# Patient Record
Sex: MALE | Race: WHITE | NOT HISPANIC OR LATINO | Employment: FULL TIME | URBAN - METROPOLITAN AREA
[De-identification: names, ages, dates, MRNs, and addresses within clinical notes are randomized per-mention and may not be internally consistent; named-entity substitution may affect disease eponyms.]

---

## 2018-08-04 ENCOUNTER — HOSPITAL ENCOUNTER (INPATIENT)
Facility: HOSPITAL | Age: 78
LOS: 2 days | Discharge: HOME | End: 2018-08-06
Attending: INTERNAL MEDICINE | Admitting: INTERNAL MEDICINE

## 2018-08-04 ENCOUNTER — APPOINTMENT (OUTPATIENT)
Dept: GENERAL RADIOLOGY | Facility: HOSPITAL | Age: 78
End: 2018-08-04

## 2018-08-04 DIAGNOSIS — I48.91 ATRIAL FIBRILLATION, TRANSIENT (HCC): ICD-10-CM

## 2018-08-04 DIAGNOSIS — I21.4 NSTEMI (NON-ST ELEVATED MYOCARDIAL INFARCTION) (HCC): Primary | ICD-10-CM

## 2018-08-04 PROBLEM — R07.9 CHEST PAIN: Status: ACTIVE | Noted: 2018-08-04

## 2018-08-04 PROBLEM — I10 ESSENTIAL HYPERTENSION: Status: ACTIVE | Noted: 2018-08-04

## 2018-08-04 PROBLEM — Z86.73 HISTORY OF CVA (CEREBROVASCULAR ACCIDENT): Status: ACTIVE | Noted: 2018-08-04

## 2018-08-04 LAB
ACT BLD: 213 SECONDS (ref 82–152)
ACT BLD: 257 SECONDS (ref 82–152)
ALBUMIN SERPL-MCNC: 3.64 G/DL (ref 3.2–4.8)
ALBUMIN/GLOB SERPL: 1.3 G/DL (ref 1.5–2.5)
ALP SERPL-CCNC: 50 U/L (ref 25–100)
ALT SERPL W P-5'-P-CCNC: 16 U/L (ref 7–40)
ANION GAP SERPL CALCULATED.3IONS-SCNC: 5 MMOL/L (ref 3–11)
APTT PPP: 47 SECONDS (ref 55–70)
APTT PPP: 69.6 SECONDS (ref 24–31)
ARTICHOKE IGE QN: 139 MG/DL (ref 0–130)
AST SERPL-CCNC: 22 U/L (ref 0–33)
BASOPHILS # BLD AUTO: 0.03 10*3/MM3 (ref 0–0.2)
BASOPHILS NFR BLD AUTO: 0.5 % (ref 0–1)
BILIRUB SERPL-MCNC: 0.6 MG/DL (ref 0.3–1.2)
BNP SERPL-MCNC: 50 PG/ML (ref 0–100)
BUN BLD-MCNC: 18 MG/DL (ref 9–23)
BUN/CREAT SERPL: 16.5 (ref 7–25)
CALCIUM SPEC-SCNC: 9.4 MG/DL (ref 8.7–10.4)
CHLORIDE SERPL-SCNC: 107 MMOL/L (ref 99–109)
CHOLEST SERPL-MCNC: 169 MG/DL (ref 0–200)
CO2 SERPL-SCNC: 30 MMOL/L (ref 20–31)
CREAT BLD-MCNC: 1.09 MG/DL (ref 0.6–1.3)
DEPRECATED RDW RBC AUTO: 43.5 FL (ref 37–54)
EOSINOPHIL # BLD AUTO: 0.28 10*3/MM3 (ref 0–0.3)
EOSINOPHIL NFR BLD AUTO: 4.8 % (ref 0–3)
ERYTHROCYTE [DISTWIDTH] IN BLOOD BY AUTOMATED COUNT: 13.5 % (ref 11.3–14.5)
GFR SERPL CREATININE-BSD FRML MDRD: 65 ML/MIN/1.73
GLOBULIN UR ELPH-MCNC: 2.9 GM/DL
GLUCOSE BLD-MCNC: 95 MG/DL (ref 70–100)
HBA1C MFR BLD: 5.6 % (ref 4.8–5.6)
HCT VFR BLD AUTO: 41.8 % (ref 38.9–50.9)
HDLC SERPL-MCNC: 37 MG/DL (ref 40–60)
HGB BLD-MCNC: 13.6 G/DL (ref 13.1–17.5)
IMM GRANULOCYTES # BLD: 0 10*3/MM3 (ref 0–0.03)
IMM GRANULOCYTES NFR BLD: 0 % (ref 0–0.6)
INR PPP: 1.1 (ref 0.91–1.09)
LYMPHOCYTES # BLD AUTO: 2.08 10*3/MM3 (ref 0.6–4.8)
LYMPHOCYTES NFR BLD AUTO: 35.6 % (ref 24–44)
MAGNESIUM SERPL-MCNC: 2 MG/DL (ref 1.3–2.7)
MCH RBC QN AUTO: 28.6 PG (ref 27–31)
MCHC RBC AUTO-ENTMCNC: 32.5 G/DL (ref 32–36)
MCV RBC AUTO: 87.8 FL (ref 80–99)
MONOCYTES # BLD AUTO: 0.49 10*3/MM3 (ref 0–1)
MONOCYTES NFR BLD AUTO: 8.4 % (ref 0–12)
NEUTROPHILS # BLD AUTO: 2.97 10*3/MM3 (ref 1.5–8.3)
NEUTROPHILS NFR BLD AUTO: 50.7 % (ref 41–71)
PLATELET # BLD AUTO: 179 10*3/MM3 (ref 150–450)
PMV BLD AUTO: 11.1 FL (ref 6–12)
POTASSIUM BLD-SCNC: 3.9 MMOL/L (ref 3.5–5.5)
PROT SERPL-MCNC: 6.5 G/DL (ref 5.7–8.2)
PROTHROMBIN TIME: 11.6 SECONDS (ref 9.6–11.5)
RBC # BLD AUTO: 4.76 10*6/MM3 (ref 4.2–5.76)
SODIUM BLD-SCNC: 142 MMOL/L (ref 132–146)
TRIGL SERPL-MCNC: 42 MG/DL (ref 0–150)
TROPONIN I SERPL-MCNC: 0.41 NG/ML
TROPONIN I SERPL-MCNC: 0.78 NG/ML
TSH SERPL DL<=0.05 MIU/L-ACNC: 7.6 MIU/ML (ref 0.35–5.35)
WBC NRBC COR # BLD: 5.85 10*3/MM3 (ref 3.5–10.8)

## 2018-08-04 PROCEDURE — 83735 ASSAY OF MAGNESIUM: CPT | Performed by: NURSE PRACTITIONER

## 2018-08-04 PROCEDURE — 25010000002 HEPARIN (PORCINE) PER 1000 UNITS: Performed by: INTERNAL MEDICINE

## 2018-08-04 PROCEDURE — C1769 GUIDE WIRE: HCPCS | Performed by: INTERNAL MEDICINE

## 2018-08-04 PROCEDURE — 71045 X-RAY EXAM CHEST 1 VIEW: CPT

## 2018-08-04 PROCEDURE — C1725 CATH, TRANSLUMIN NON-LASER: HCPCS | Performed by: INTERNAL MEDICINE

## 2018-08-04 PROCEDURE — 80061 LIPID PANEL: CPT | Performed by: NURSE PRACTITIONER

## 2018-08-04 PROCEDURE — 84484 ASSAY OF TROPONIN QUANT: CPT | Performed by: NURSE PRACTITIONER

## 2018-08-04 PROCEDURE — 80053 COMPREHEN METABOLIC PANEL: CPT | Performed by: NURSE PRACTITIONER

## 2018-08-04 PROCEDURE — C1887 CATHETER, GUIDING: HCPCS | Performed by: INTERNAL MEDICINE

## 2018-08-04 PROCEDURE — 93458 L HRT ARTERY/VENTRICLE ANGIO: CPT | Performed by: INTERNAL MEDICINE

## 2018-08-04 PROCEDURE — 0 IOPAMIDOL PER 1 ML: Performed by: INTERNAL MEDICINE

## 2018-08-04 PROCEDURE — 25010000002 HEPARIN (PORCINE) PER 1000 UNITS: Performed by: NURSE PRACTITIONER

## 2018-08-04 PROCEDURE — 85730 THROMBOPLASTIN TIME PARTIAL: CPT | Performed by: NURSE PRACTITIONER

## 2018-08-04 PROCEDURE — 85610 PROTHROMBIN TIME: CPT | Performed by: NURSE PRACTITIONER

## 2018-08-04 PROCEDURE — 25010000002 FENTANYL CITRATE (PF) 100 MCG/2ML SOLUTION: Performed by: INTERNAL MEDICINE

## 2018-08-04 PROCEDURE — 83036 HEMOGLOBIN GLYCOSYLATED A1C: CPT | Performed by: NURSE PRACTITIONER

## 2018-08-04 PROCEDURE — 92921 PR PRQ TRLUML CORONARY ANGIOPLASTY ADDL BRANCH: CPT | Performed by: INTERNAL MEDICINE

## 2018-08-04 PROCEDURE — 4A023N7 MEASUREMENT OF CARDIAC SAMPLING AND PRESSURE, LEFT HEART, PERCUTANEOUS APPROACH: ICD-10-PCS | Performed by: INTERNAL MEDICINE

## 2018-08-04 PROCEDURE — 25010000002 MIDAZOLAM PER 1 MG: Performed by: INTERNAL MEDICINE

## 2018-08-04 PROCEDURE — 25010000002 ONDANSETRON PER 1 MG: Performed by: INTERNAL MEDICINE

## 2018-08-04 PROCEDURE — 027034Z DILATION OF CORONARY ARTERY, ONE ARTERY WITH DRUG-ELUTING INTRALUMINAL DEVICE, PERCUTANEOUS APPROACH: ICD-10-PCS | Performed by: INTERNAL MEDICINE

## 2018-08-04 PROCEDURE — 02703ZZ DILATION OF CORONARY ARTERY, ONE ARTERY, PERCUTANEOUS APPROACH: ICD-10-PCS | Performed by: INTERNAL MEDICINE

## 2018-08-04 PROCEDURE — C1894 INTRO/SHEATH, NON-LASER: HCPCS | Performed by: INTERNAL MEDICINE

## 2018-08-04 PROCEDURE — 92928 PRQ TCAT PLMT NTRAC ST 1 LES: CPT | Performed by: INTERNAL MEDICINE

## 2018-08-04 PROCEDURE — B2151ZZ FLUOROSCOPY OF LEFT HEART USING LOW OSMOLAR CONTRAST: ICD-10-PCS | Performed by: INTERNAL MEDICINE

## 2018-08-04 PROCEDURE — 85025 COMPLETE CBC W/AUTO DIFF WBC: CPT | Performed by: NURSE PRACTITIONER

## 2018-08-04 PROCEDURE — 83880 ASSAY OF NATRIURETIC PEPTIDE: CPT | Performed by: NURSE PRACTITIONER

## 2018-08-04 PROCEDURE — 92921: CPT | Performed by: INTERNAL MEDICINE

## 2018-08-04 PROCEDURE — C9600 PERC DRUG-EL COR STENT SING: HCPCS | Performed by: INTERNAL MEDICINE

## 2018-08-04 PROCEDURE — 85347 COAGULATION TIME ACTIVATED: CPT

## 2018-08-04 PROCEDURE — 93005 ELECTROCARDIOGRAM TRACING: CPT | Performed by: NURSE PRACTITIONER

## 2018-08-04 PROCEDURE — B2111ZZ FLUOROSCOPY OF MULTIPLE CORONARY ARTERIES USING LOW OSMOLAR CONTRAST: ICD-10-PCS | Performed by: INTERNAL MEDICINE

## 2018-08-04 PROCEDURE — 99222 1ST HOSP IP/OBS MODERATE 55: CPT | Performed by: INTERNAL MEDICINE

## 2018-08-04 PROCEDURE — 84443 ASSAY THYROID STIM HORMONE: CPT | Performed by: NURSE PRACTITIONER

## 2018-08-04 PROCEDURE — 93010 ELECTROCARDIOGRAM REPORT: CPT | Performed by: INTERNAL MEDICINE

## 2018-08-04 PROCEDURE — C1874 STENT, COATED/COV W/DEL SYS: HCPCS | Performed by: INTERNAL MEDICINE

## 2018-08-04 PROCEDURE — 99223 1ST HOSP IP/OBS HIGH 75: CPT | Performed by: INTERNAL MEDICINE

## 2018-08-04 DEVICE — XIENCE SIERRA™ EVEROLIMUS ELUTING CORONARY STENT SYSTEM 2.25 MM X 38 MM / RAPID-EXCHANGE
Type: IMPLANTABLE DEVICE | Status: FUNCTIONAL
Brand: XIENCE SIERRA™

## 2018-08-04 RX ORDER — MIDAZOLAM HYDROCHLORIDE 1 MG/ML
INJECTION INTRAMUSCULAR; INTRAVENOUS AS NEEDED
Status: DISCONTINUED | OUTPATIENT
Start: 2018-08-04 | End: 2018-08-04 | Stop reason: HOSPADM

## 2018-08-04 RX ORDER — ONDANSETRON 2 MG/ML
INJECTION INTRAMUSCULAR; INTRAVENOUS AS NEEDED
Status: DISCONTINUED | OUTPATIENT
Start: 2018-08-04 | End: 2018-08-04 | Stop reason: HOSPADM

## 2018-08-04 RX ORDER — ASPIRIN 81 MG/1
81 TABLET ORAL DAILY
Status: DISCONTINUED | OUTPATIENT
Start: 2018-08-05 | End: 2018-08-06 | Stop reason: HOSPADM

## 2018-08-04 RX ORDER — ATORVASTATIN CALCIUM 40 MG/1
40 TABLET, FILM COATED ORAL NIGHTLY
Status: DISCONTINUED | OUTPATIENT
Start: 2018-08-04 | End: 2018-08-06 | Stop reason: HOSPADM

## 2018-08-04 RX ORDER — ASPIRIN 325 MG
325 TABLET ORAL DAILY
Status: DISCONTINUED | OUTPATIENT
Start: 2018-08-04 | End: 2018-08-04

## 2018-08-04 RX ORDER — CLOPIDOGREL BISULFATE 75 MG/1
75 TABLET ORAL DAILY
Status: DISCONTINUED | OUTPATIENT
Start: 2018-08-04 | End: 2018-08-06 | Stop reason: HOSPADM

## 2018-08-04 RX ORDER — LISINOPRIL 20 MG/1
20 TABLET ORAL DAILY
COMMUNITY
End: 2018-08-06 | Stop reason: HOSPADM

## 2018-08-04 RX ORDER — CARVEDILOL 6.25 MG/1
6.25 TABLET ORAL EVERY 12 HOURS SCHEDULED
Status: DISCONTINUED | OUTPATIENT
Start: 2018-08-04 | End: 2018-08-05

## 2018-08-04 RX ORDER — ASPIRIN 81 MG/1
81 TABLET ORAL DAILY
COMMUNITY

## 2018-08-04 RX ORDER — NITROGLYCERIN 10 MG/100ML
INJECTION INTRAVENOUS CONTINUOUS PRN
Status: COMPLETED | OUTPATIENT
Start: 2018-08-04 | End: 2018-08-04

## 2018-08-04 RX ORDER — ACETAMINOPHEN 325 MG/1
650 TABLET ORAL EVERY 4 HOURS PRN
Status: DISCONTINUED | OUTPATIENT
Start: 2018-08-04 | End: 2018-08-06 | Stop reason: HOSPADM

## 2018-08-04 RX ORDER — CARVEDILOL 3.12 MG/1
3.12 TABLET ORAL EVERY 12 HOURS SCHEDULED
Status: DISCONTINUED | OUTPATIENT
Start: 2018-08-04 | End: 2018-08-04

## 2018-08-04 RX ORDER — ONDANSETRON 2 MG/ML
4 INJECTION INTRAMUSCULAR; INTRAVENOUS EVERY 6 HOURS PRN
Status: DISCONTINUED | OUTPATIENT
Start: 2018-08-04 | End: 2018-08-06 | Stop reason: HOSPADM

## 2018-08-04 RX ORDER — ONDANSETRON 4 MG/1
4 TABLET, FILM COATED ORAL EVERY 6 HOURS PRN
Status: DISCONTINUED | OUTPATIENT
Start: 2018-08-04 | End: 2018-08-06 | Stop reason: HOSPADM

## 2018-08-04 RX ORDER — METOPROLOL TARTRATE 5 MG/5ML
INJECTION INTRAVENOUS AS NEEDED
Status: DISCONTINUED | OUTPATIENT
Start: 2018-08-04 | End: 2018-08-04 | Stop reason: HOSPADM

## 2018-08-04 RX ORDER — SODIUM CHLORIDE 0.9 % (FLUSH) 0.9 %
1-10 SYRINGE (ML) INJECTION AS NEEDED
Status: DISCONTINUED | OUTPATIENT
Start: 2018-08-04 | End: 2018-08-06 | Stop reason: HOSPADM

## 2018-08-04 RX ORDER — HEPARIN SODIUM 1000 [USP'U]/ML
INJECTION, SOLUTION INTRAVENOUS; SUBCUTANEOUS AS NEEDED
Status: DISCONTINUED | OUTPATIENT
Start: 2018-08-04 | End: 2018-08-04 | Stop reason: HOSPADM

## 2018-08-04 RX ORDER — LIDOCAINE HYDROCHLORIDE 10 MG/ML
INJECTION, SOLUTION EPIDURAL; INFILTRATION; INTRACAUDAL; PERINEURAL AS NEEDED
Status: DISCONTINUED | OUTPATIENT
Start: 2018-08-04 | End: 2018-08-04 | Stop reason: HOSPADM

## 2018-08-04 RX ORDER — NITROGLYCERIN 0.4 MG/1
0.4 TABLET SUBLINGUAL
Status: DISCONTINUED | OUTPATIENT
Start: 2018-08-04 | End: 2018-08-06 | Stop reason: HOSPADM

## 2018-08-04 RX ORDER — FENTANYL CITRATE 50 UG/ML
INJECTION, SOLUTION INTRAMUSCULAR; INTRAVENOUS AS NEEDED
Status: DISCONTINUED | OUTPATIENT
Start: 2018-08-04 | End: 2018-08-04 | Stop reason: HOSPADM

## 2018-08-04 RX ORDER — SODIUM CHLORIDE 9 MG/ML
1 INJECTION, SOLUTION INTRAVENOUS CONTINUOUS
Status: ACTIVE | OUTPATIENT
Start: 2018-08-04 | End: 2018-08-04

## 2018-08-04 RX ORDER — LISINOPRIL 20 MG/1
20 TABLET ORAL DAILY
Status: DISCONTINUED | OUTPATIENT
Start: 2018-08-04 | End: 2018-08-05

## 2018-08-04 RX ORDER — ATORVASTATIN CALCIUM 40 MG/1
80 TABLET, FILM COATED ORAL NIGHTLY
Status: DISCONTINUED | OUTPATIENT
Start: 2018-08-04 | End: 2018-08-04

## 2018-08-04 RX ADMIN — ASPIRIN 325 MG ORAL TABLET 325 MG: 325 PILL ORAL at 09:15

## 2018-08-04 RX ADMIN — CARVEDILOL 3.12 MG: 3.12 TABLET, FILM COATED ORAL at 09:15

## 2018-08-04 RX ADMIN — RIVAROXABAN 20 MG: 20 TABLET, FILM COATED ORAL at 17:56

## 2018-08-04 RX ADMIN — DESMOPRESSIN ACETATE 40 MG: 0.2 TABLET ORAL at 20:23

## 2018-08-04 RX ADMIN — HEPARIN SODIUM 10.82 UNITS/KG/HR: 10000 INJECTION, SOLUTION INTRAVENOUS at 03:28

## 2018-08-04 RX ADMIN — LISINOPRIL 20 MG: 20 TABLET ORAL at 09:16

## 2018-08-04 RX ADMIN — CLOPIDOGREL BISULFATE 75 MG: 75 TABLET ORAL at 09:15

## 2018-08-04 NOTE — H&P
[facility]  HISTORY AND PHYSICAL    Patient Name: Palomo Rainey  : 1940  MRN: [id number]  Primary Care Physician: Graciela Dodson MD    Subjective   Subjective     Chief Complaint:  Chest pain    HPI:  Palomo Rainey is a 78 y.o. male with a history of HTN, CVA, was transferred from [facility] with complaints of chest pain that started around 1900.  Patient reports his pain is located in the central chest area and radiates to his neck.  He describes his pain as pressure with associated mild shortness of air and nausea.  Initially he thought he was having gas pain and took approximately 12 tums which did help his symptoms but shortly afterwards his pain returned.  He has had three episodes of chest pain tonight, all of which occurred at rest.  He denies any family history of heart disease.  He had a mildly elevated troponin at OSH and was given asa, plavix 600 mg, and started on a heparin drip.  Patient was transferred here to [facility], and is being admitted to the Hospitalist for further evaluation and management.    Review of Systems   Constitutional: Negative.    HENT: Negative.    Eyes: Negative.    Respiratory: Positive for shortness of breath. Negative for cough.    Cardiovascular: Positive for chest pain. Negative for palpitations and leg swelling.   Gastrointestinal: Positive for nausea. Negative for abdominal distention, abdominal pain, diarrhea and vomiting.   Endocrine: Negative.    Genitourinary: Negative.    Musculoskeletal: Negative.    Skin: Negative.    Allergic/Immunologic: Negative.    Hematological: Negative.    Psychiatric/Behavioral: Negative.           Otherwise 10-system ROS reviewed and is negative except as mentioned in the HPI.    Personal History     Past Medical History:   Diagnosis Date   • Hypertension    • Stroke (CMS/HCC)        No past surgical history on file.    Family History: family history is not on file.      Social History:  reports that he has quit smoking. He has never used smokeless tobacco. He reports that he does not drink alcohol or use drugs.  Social History     Social History Narrative   • No narrative on file       Medications:  Prescriptions Prior to Admission   Medication Sig Dispense Refill Last Dose   • aspirin 81 MG EC tablet Take 81 mg by mouth Daily.      • lisinopril (PRINIVIL,ZESTRIL) 20 MG tablet Take 20 mg by mouth Daily.          No Known Allergies    Objective   Objective     Vital Signs:   Heart Rate:  [71] 71        Physical Exam   Constitutional: He is oriented to person, place, and time. He appears well-developed and well-nourished. No distress.   HENT:   Head: Normocephalic.   Eyes: Pupils are equal, round, and reactive to light.   Neck: Normal range of motion. Neck supple.   Cardiovascular: Normal rate, regular rhythm, normal heart sounds and intact distal pulses.  Exam reveals no gallop and no friction rub.    No murmur heard.  Pulmonary/Chest: Effort normal and breath sounds normal. No respiratory distress. He has no wheezes. He has no rales. He exhibits no tenderness.   Abdominal: Soft. Bowel sounds are normal. He exhibits no distension and no mass. There is no tenderness. There is no rebound and no guarding.   Musculoskeletal: Normal range of motion. He exhibits no edema, tenderness or deformity.   Neurological: He is alert and oriented to person, place, and time. No cranial nerve deficit.   Skin: Skin is warm and dry. No rash noted. He is not diaphoretic. No erythema. No pallor.   Psychiatric: He has a normal mood and affect. His behavior is normal. Judgment and thought content normal.        Results Reviewed:  I have personally reviewed current lab, radiology, and data and agree.              Invalid input(s):  ALKPHOS, TROPONININT  CrCl cannot be calculated (No order found.).  Brief Urine Lab Results     None        No results found for: BNP  Imaging Results (last 24 hours)      Procedure Component Value Units Date/Time    XR Chest 1 View [[id number]] Updated:  08/04/18 0257             Assessment/Plan   Assessment / Plan     Hospital Problem List     * (Principal)NSTEMI (non-ST elevated myocardial infarction) (CMS/[facility])    Essential hypertension    History of CVA (cerebrovascular accident)            Assessment & Plan:    1.  Chest pain with mildly elevated troponin at OSH   -was given asa, plavix, and heparin gtt at OSH   -stat EKG   -cxr   -asa and plavix daily   -high intensity statin   -serial troponin   -stat cbc, cmp, troponin, pt/ptt, mg   -continue heparin gtt   -nitro prn   -consult cardiology in the am   -npo   -BB, ace      2.  Essential HTN   -continue lisinopril    3.  History of CVA    DVT prophylaxis:  Heparin     CODE STATUS:    Code Status and Medical Interventions:   Ordered at: 08/04/18 0335     Level Of Support Discussed With:    Patient     Code Status:    CPR     Medical Interventions (Level of Support Prior to Arrest):    Full       I believe this patient meets OBSERVATION status, however if further evaluation or treatment plans warrant, status may change.  Based upon current information, I predict patient's care encounter to be less than or equal to 2 midnights.      Electronically signed by VALERIA Gomez, 08/04/18, 2:55 AM.      Brief Attending Admission Attestation     I have seen and examined the patient, performing an independent face-to-face diagnostic evaluation with plan of care reviewed and developed with the advanced practice clinician (APC).      Brief Summary Statement/HPI:   Palomo Rainey is a 78 y.o. male with a past medical history significant for prior stroke and hypertension who presents to the ED at an outside hospital due to substernal burning chest pain radiating into his neck.  Patient states that he was driving when symptoms initially occurred lasting greater than 30 minutes.  He initially took several times with some improvement of  symptoms.  His symptoms reoccurred and family urged him to come to the ED.  At outside hospital, patient had another episode of chest pain and was given nitroglycerin with improvement of symptoms.  Patient reports associated difficulty breathing and lightheaded sensation, denies diaphoresis, nausea, vomiting.      Attending Physical Exam:  Constitutional: No acute distress, awake, alert  Eyes: PERRLA, sclerae anicteric, no conjunctival injection  HENT: NCAT, mucous membranes moist  Neck: Supple, no thyromegaly, no lymphadenopathy, trachea midline  Respiratory: Clear to auscultation bilaterally, with mild bilateral basilar crackles   Cardiovascular: RRR, no murmurs, rubs, or gallops, palpable pedal pulses bilaterally  Gastrointestinal: Positive bowel sounds, soft, nontender, nondistended  Musculoskeletal: No bilateral ankle edema, no clubbing or cyanosis to extremities  Psychiatric: Appropriate affect, cooperative  Neurologic: Oriented x 3, strength symmetric in all extremities, Cranial Nerves grossly intact to confrontation, speech clear  Skin: No rashes        Brief Assessment/Plan :  See above for further detailed assessment and plan developed with APC which I have reviewed and/or edited.      Electronically signed by Inez Truong DO, 08/04/18, 3:42 AM.

## 2018-08-04 NOTE — PLAN OF CARE
Problem: Patient Care Overview  Goal: Plan of Care Review  Outcome: Ongoing (interventions implemented as appropriate)   08/04/18 0240   Coping/Psychosocial   Patient Agreement with Plan of Care agrees   Plan of Care Review   Progress no change   Coping/Psychosocial   Plan of Care Reviewed With patient       Problem: Cardiac: ACS (Acute Coronary Syndrome) (Adult)  Goal: Signs and Symptoms of Listed Potential Problems Will be Absent, Minimized or Managed (Cardiac: ACS)  Outcome: Ongoing (interventions implemented as appropriate)   08/04/18 0240   Goal/Outcome Evaluation   Problems Present (Acute Coronary Syn) chest pain (angina)

## 2018-08-04 NOTE — NURSING NOTE
ACC REVIEW REPORT: [facility]        PATIENT NAME: Palomo Rainey    PATIENT ID: [id number]    BED: S470    BED TYPE: TELEMETRY    BED GIVEN TO: CHANG TRAN RN    TIME BED GIVEN: 0130    YOB: 1940    AGE: 78    GENDER: MALE    PREVIOUS ADMIT TO [facility]: NO    PREVIOUS ADMISSION DATE: N/A    PATIENT CLASS: OBSERVATION    TODAY'S DATE: 8/4/2018    TRANSFER DATE: 8/4/18    ETA: 0215    TRANSFERRING FACILITY: [facility]    TRANSFERRING FACILITY PHONE # : [phone]    TRANSFERRING MD: KATHRIN    DATE/TIME REQUEST RECEIVED: 8/4/18 AT 0045    [facility] RN: JOSE SOARES RN    REPORT FROM: CHANG TRAN RN    TIME REPORT TAKEN: 0125    DIAGNOSIS: NSTEMI    REASON FOR TRANSFER TO [facility]: HIGHER LEVEL OF CARE    TRANSPORTATION: AMBULANCE    CLINICAL REASON FOR TRANSFER TO [facility]: PATIENT CAME TO ER WITH C/O CP THAT RADIATES TO HIS NECK.  RATING IT 6/10.      CLINICAL INFORMATION    HEIGHT: 74 INCHES    WEIGHT: 92.1 KG    ALLERGIES: NKDA    TIERNEY: NO    INFECTIOUS DISEASE: NO    ISOLATION: NO    LAST VITAL SIGNS:  TIME: 0130  TEMP: 97.8  PULSE: 64  B/P: 147/85  RESP: 16    LAB INFORMATION: BUN 20, CHLORIDE 109, ALBUMIN 3.3    MEDS/IV FLUIDS: #20 RAC #28 LAC PT HAS HAD 1 INCH NITRO PASTE TO CW, 4000 UNIT HEPARIN BOLUS, GTT AT 12 U/KG/HR, 600 MG PLAVIX PO, 325 MG ASA      CARDIAC SYSTEM:    CHEST PAIN: YES    RATE: 3    SCALE: 1/10    RHYTHM: NSR R BBB    Is patient taking or has patient been given any drugs that could increase bleeding? NO  (Plavix, Brilinta, Effient, Eliquis, Xarelto, Warfarin, Integrilin, Angiomax)    CARDIAC ENZYMES:    DATE: 8/3/18  TROP: <0.017    DATE: 8/4/18  TIME: 0034  TROP: 0.054    RESPIRATORY SYSTEM:    LUNG SOUNDS:    CLEAR: Y    OXYGEN: YES    O2 SAT: 97%    ADMINISTRATION ROUTE: 2L NC    IMAGING FINDINGS: WILL SEND    CNS/MUSCULOSKELETAL    ALERT AND ORIENTED:    PERSON: Y  PLACE: Y  TIME: Y    INJURY:  WHERE: NO    RENEA COMA SCALE:    E: 4  M: 6  V:  5    GI//GY      ABDOMINAL PAIN: N    VOMITING: N    DIARRHEA: N    NAUSEA: N    BOWEL SOUNDS: ACTIVE    OCCULT STOOL: UNKNOWN    VAGINAL BLEEDING: N/A    TESTICULAR PAIN: NO    HEMATURIA: NO    PAST MEDICAL HISTORY: HTN    Bethany Sidhu RN  8/4/2018  1:34 AM

## 2018-08-04 NOTE — PROGRESS NOTES
Brief Note/Update    Heart cath results:  IMPRESSION:  · There was severe 1 vessel coronary artery disease involving a 90% discrete mid circumflex stenosis followed by multiple intermediate lesions extending into the first obtuse marginal branch.  These lesions are status post intervention with a Xience Sydni 2.25 x 38 mm drug-eluting stent with post dilatation of the proximal segment with a 3.0 mm diameter balloon.  The bifurcation of the first and second obtuse marginal branches were then treated with a kissing balloon inflation.  · LVEF 60% with mild hypokinesis of the basal to mid anterolateral segment  · The patient went into atrial fibrillation during the procedure.  Rate controlled with heart rate ranging from 60-80 bpm. I suspect the patient has been going in and out of atrial fibrillation at home since the family has noted specifically that his rate has jumped from 60-80 intermittently over the last couple weeks.  When he went into atrial fibrillation here today, his heart rate was in the 80s..     RECOMMENDATIONS:  · Clinical monitoring  · Transthoracic echocardiogram  · Nitroglycerin drip to maintain a systolic blood pressure less than 1 30 mmHg  · Normal saline at 1 per hour for 4 hours  · Dual and a  therapy  · Beta blocker therapy and high intensity statin  · Initiation of Xarelto this evening for atrial fibrillation  · Cardiac rehabilitation consultation  · Aggressive lifestyle and risk factor modification for CAD    Meliton Cantu MD, MSc, [facility]  Interventional Cardiology  [facility]

## 2018-08-04 NOTE — PROGRESS NOTES
Patient seen and examined this morning during rounds.H&P reviewed, he was admitted overnight with suspected NSTEMI after presenting with chest pain, troponin elevated EKG wnl. He is currently chest pain free he has been evaluated  by cardiology who plan on  [facility] this afternoon.

## 2018-08-04 NOTE — CONSULTS
[facility]   [address], [address]  [place], [place], [address] [phone]  [email or web address]           INPATIENT CONSULTATION NOTE    Referring Physician: DO Alexi Dailey Josie G, Do  [address]  [address]  [place] [address]    Patient Care Team:  Patient Care Team:  Graciela Dodson MD as PCP - General  Graciela Dodson MD as PCP - Family Medicine    Reason for consultation: Chest pain         HPI:    Palomo Rainey is a 78 y.o. male.  History of Present Illness  The patient has a history of essential hypertension, stroke, who was transferred from [facility] with complaints of chest pain that started yesterday around 7 PM.  The pain is centrally located, substernal, pressure-like, radiates to the neck, associated with mild dyspnea and nausea.  Tums helped his symptoms briefly but then his pain recurred.  I totally had 3 episodes of pain at rest.  No prior history of heart disease, no family history of heart disease, former tobacco use.    PFSH:  Patient Active Problem List   Diagnosis   • NSTEMI (non-ST elevated myocardial infarction) (CMS/[facility])   • Essential hypertension   • History of CVA (cerebrovascular accident)   • Chest pain       No current facility-administered medications on file prior to encounter.      No current outpatient prescriptions on file prior to encounter.     No Known Allergies    Social History     Social History   • Marital status:      Social History Main Topics   • Smoking status: Former Smoker   • Smokeless tobacco: Never Used   • Alcohol use No   • Drug use: No     Other Topics Concern   • Not on file     No family history on file.    Review of Systems:  Positive for chest pain, dyspnea, nausea  All other systems reviewed are negative.         Objective:     Vital Sign Min/Max for last 24 hours  Temp  Min: 97.6 °F (36.4 °C)  Max: 98 °F (36.7 °C)   BP  Min: 125/80  Max: 153/83   Pulse   Min: 53  Max: 74   Resp  Min: 18  Max: 18   SpO2  Min: 95 %  Max: 97 %   No Data Recorded    No intake or output data in the 24 hours ending 08/04/18 0819        Vitals:    08/04/18 0600   BP:    Pulse: 53   Resp:    Temp:    SpO2: 96%       CONSTITUTIONAL: Well-nourished. In no acute distress.   SKIN: Warm and dry. No rashes noted  HEENT: Head is normocephalic and atraumatic. Mucous membranes are pink and moist.   NECK: Supple without masses or thyromegaly.   LUNGS: Normal effort. Clear to auscultation bilaterally without wheezing, rhonchi, or rales noted.   CARDIOVASCULAR: The heart has a regular rate with a normal S1 and S2. There is no murmur, gallop, rub, or click appreciated.   PERIPHERAL VASCULAR: Carotid upstroke is 2+ bilaterally and without bruits. Radial pulses are 2+ bilaterally. Dorsalis Pedis pulses are 2+ and symmetrical. There is no lower extremity edema.   ABDOMEN: Soft with no tenderness with palpitation. No hepatosplenomegaly  MUSCULOSKELETAL:  No digital cyanosis  NEUROLOGICAL: Nonfocal.  PSYCHIATRIC: Alert, orientated x 3, appropriate affect     Labs:  Lab Results   Component Value Date    TROPONINI 0.407 (H) 08/04/2018       Glucose   Date Value Ref Range Status   08/04/2018 95 70 - 100 mg/dL Final     BUN   Date Value Ref Range Status   08/04/2018 18 9 - 23 mg/dL Final     Creatinine   Date Value Ref Range Status   08/04/2018 1.09 0.60 - 1.30 mg/dL Final     Sodium   Date Value Ref Range Status   08/04/2018 142 132 - 146 mmol/L Final     Potassium   Date Value Ref Range Status   08/04/2018 3.9 3.5 - 5.5 mmol/L Final     Chloride   Date Value Ref Range Status   08/04/2018 107 99 - 109 mmol/L Final     CO2   Date Value Ref Range Status   08/04/2018 30.0 20.0 - 31.0 mmol/L Final     Calcium   Date Value Ref Range Status   08/04/2018 9.4 8.7 - 10.4 mg/dL Final     Total Protein   Date Value Ref Range Status   08/04/2018 6.5 5.7 - 8.2 g/dL Final     Albumin   Date Value Ref Range Status    08/04/2018 3.64 3.20 - 4.80 g/dL Final     ALT (SGPT)   Date Value Ref Range Status   08/04/2018 16 7 - 40 U/L Final     AST (SGOT)   Date Value Ref Range Status   08/04/2018 22 0 - 33 U/L Final     Alkaline Phosphatase   Date Value Ref Range Status   08/04/2018 50 25 - 100 U/L Final     Total Bilirubin   Date Value Ref Range Status   08/04/2018 0.6 0.3 - 1.2 mg/dL Final     eGFR Non  Amer   Date Value Ref Range Status   08/04/2018 65 >60 mL/min/1.73 Final     BUN/Creatinine Ratio   Date Value Ref Range Status   08/04/2018 16.5 7.0 - 25.0 Final     Anion Gap   Date Value Ref Range Status   08/04/2018 5.0 3.0 - 11.0 mmol/L Final       Lab Results   Component Value Date    CHOL 169 08/04/2018     Lab Results   Component Value Date    TRIG 42 08/04/2018     Lab Results   Component Value Date    HDL 37 (L) 08/04/2018     No components found for: LDLCALC  No results found for: VLDL  No results found for: LDLHDL    WBC   Date Value Ref Range Status   08/04/2018 5.85 3.50 - 10.80 10*3/mm3 Final     RBC   Date Value Ref Range Status   08/04/2018 4.76 4.20 - 5.76 10*6/mm3 Final     Hemoglobin   Date Value Ref Range Status   08/04/2018 13.6 13.1 - 17.5 g/dL Final     Hematocrit   Date Value Ref Range Status   08/04/2018 41.8 38.9 - 50.9 % Final     MCV   Date Value Ref Range Status   08/04/2018 87.8 80.0 - 99.0 fL Final     MCH   Date Value Ref Range Status   08/04/2018 28.6 27.0 - 31.0 pg Final     MCHC   Date Value Ref Range Status   08/04/2018 32.5 32.0 - 36.0 g/dL Final     RDW   Date Value Ref Range Status   08/04/2018 13.5 11.3 - 14.5 % Final     RDW-SD   Date Value Ref Range Status   08/04/2018 43.5 37.0 - 54.0 fl Final     MPV   Date Value Ref Range Status   08/04/2018 11.1 6.0 - 12.0 fL Final     Platelets   Date Value Ref Range Status   08/04/2018 179 150 - 450 10*3/mm3 Final     Neutrophil %   Date Value Ref Range Status   08/04/2018 50.7 41.0 - 71.0 % Final     Lymphocyte %   Date Value Ref Range Status    08/04/2018 35.6 24.0 - 44.0 % Final     Monocyte %   Date Value Ref Range Status   08/04/2018 8.4 0.0 - 12.0 % Final     Eosinophil %   Date Value Ref Range Status   08/04/2018 4.8 (H) 0.0 - 3.0 % Final     Basophil %   Date Value Ref Range Status   08/04/2018 0.5 0.0 - 1.0 % Final     Immature Grans %   Date Value Ref Range Status   08/04/2018 0.0 0.0 - 0.6 % Final     Neutrophils, Absolute   Date Value Ref Range Status   08/04/2018 2.97 1.50 - 8.30 10*3/mm3 Final     Lymphocytes, Absolute   Date Value Ref Range Status   08/04/2018 2.08 0.60 - 4.80 10*3/mm3 Final     Monocytes, Absolute   Date Value Ref Range Status   08/04/2018 0.49 0.00 - 1.00 10*3/mm3 Final     Eosinophils, Absolute   Date Value Ref Range Status   08/04/2018 0.28 0.00 - 0.30 10*3/mm3 Final     Basophils, Absolute   Date Value Ref Range Status   08/04/2018 0.03 0.00 - 0.20 10*3/mm3 Final     Immature Grans, Absolute   Date Value Ref Range Status   08/04/2018 0.00 0.00 - 0.03 10*3/mm3 Final         Diagnostic Data:    EKG:  NSR    Tele:  NSR    CXR:   IMPRESSION:  Mild chronic lung changes however no acute cardiopulmonary  process. Specifically no overt edema.         Assessment and Plan:   ASSESSMENT:  -NSTEMI, chest pain syndrome, no prior history, risk factors include Age, HTN, prior tobacco, prior CVA  -HTN  -Prior CVA    PLAN:  -Left heart catheterization with possible coronary intervention  -Continue dual antiplatelet therapy, statin, beta blocker  -Heparin gtt until heart cath  -Echocardiogram  -Further recs to follow    Meliton Cantu MD, MSc, [facility]  Interventional Cardiology  [facility]    8/4/2018

## 2018-08-04 NOTE — PLAN OF CARE
Problem: Patient Care Overview  Goal: Plan of Care Review  Outcome: Ongoing (interventions implemented as appropriate)   08/04/18 0910   Plan of Care Review   Progress improving   Coping/Psychosocial   Plan of Care Reviewed With patient   OTHER   Outcome Summary VSS. A-FIB on monitor. tr band in place site stable but bruised. no c/p's of chest noted at this time. wsill contijue to monitor.       Problem: Cardiac: ACS (Acute Coronary Syndrome) (Adult)  Goal: Signs and Symptoms of Listed Potential Problems Will be Absent, Minimized or Managed (Cardiac: ACS)  Outcome: Ongoing (interventions implemented as appropriate)

## 2018-08-05 ENCOUNTER — APPOINTMENT (OUTPATIENT)
Dept: CARDIOLOGY | Facility: HOSPITAL | Age: 78
End: 2018-08-05
Attending: INTERNAL MEDICINE

## 2018-08-05 LAB
ANION GAP SERPL CALCULATED.3IONS-SCNC: 5 MMOL/L (ref 3–11)
BH CV ECHO MEAS - AO ROOT AREA (BSA CORRECTED): 1.7
BH CV ECHO MEAS - AO ROOT AREA: 11.3 CM^2
BH CV ECHO MEAS - AO ROOT DIAM: 3.8 CM
BH CV ECHO MEAS - BSA(HAYCOCK): 2.2 M^2
BH CV ECHO MEAS - BSA: 2.2 M^2
BH CV ECHO MEAS - BZI_BMI: 26.2 KILOGRAMS/M^2
BH CV ECHO MEAS - BZI_METRIC_HEIGHT: 188 CM
BH CV ECHO MEAS - BZI_METRIC_WEIGHT: 92.5 KG
BH CV ECHO MEAS - CONTRAST EF (2CH): 62.7 ML/M^2
BH CV ECHO MEAS - CONTRAST EF 4CH: 58 ML/M^2
BH CV ECHO MEAS - EDV(CUBED): 99.6 ML
BH CV ECHO MEAS - EDV(MOD-SP2): 75 ML
BH CV ECHO MEAS - EDV(MOD-SP4): 81 ML
BH CV ECHO MEAS - EDV(TEICH): 99.1 ML
BH CV ECHO MEAS - EF(CUBED): 69 %
BH CV ECHO MEAS - EF(MOD-BP): 61 %
BH CV ECHO MEAS - EF(MOD-SP2): 62.7 %
BH CV ECHO MEAS - EF(MOD-SP4): 58 %
BH CV ECHO MEAS - EF(TEICH): 60.6 %
BH CV ECHO MEAS - ESV(CUBED): 30.8 ML
BH CV ECHO MEAS - ESV(MOD-SP2): 28 ML
BH CV ECHO MEAS - ESV(MOD-SP4): 34 ML
BH CV ECHO MEAS - ESV(TEICH): 39 ML
BH CV ECHO MEAS - FS: 32.3 %
BH CV ECHO MEAS - IVS/LVPW: 0.94
BH CV ECHO MEAS - IVSD: 0.81 CM
BH CV ECHO MEAS - LA DIMENSION: 3.2 CM
BH CV ECHO MEAS - LA/AO: 0.84
BH CV ECHO MEAS - LAT PEAK E' VEL: 8.6 CM/SEC
BH CV ECHO MEAS - LV DIASTOLIC VOL/BSA (35-75): 37 ML/M^2
BH CV ECHO MEAS - LV IVRT: 0.07 SEC
BH CV ECHO MEAS - LV MASS(C)D: 126.1 GRAMS
BH CV ECHO MEAS - LV MASS(C)DI: 57.5 GRAMS/M^2
BH CV ECHO MEAS - LV MAX PG: 3.7 MMHG
BH CV ECHO MEAS - LV MEAN PG: 1.8 MMHG
BH CV ECHO MEAS - LV SYSTOLIC VOL/BSA (12-30): 15.5 ML/M^2
BH CV ECHO MEAS - LV V1 MAX: 95.8 CM/SEC
BH CV ECHO MEAS - LV V1 MEAN: 61.6 CM/SEC
BH CV ECHO MEAS - LV V1 VTI: 17.2 CM
BH CV ECHO MEAS - LVIDD: 4.6 CM
BH CV ECHO MEAS - LVIDS: 3.1 CM
BH CV ECHO MEAS - LVLD AP2: 7.7 CM
BH CV ECHO MEAS - LVLD AP4: 8 CM
BH CV ECHO MEAS - LVLS AP2: 6.1 CM
BH CV ECHO MEAS - LVLS AP4: 6.4 CM
BH CV ECHO MEAS - LVOT AREA (M): 4.2 CM^2
BH CV ECHO MEAS - LVOT AREA: 4.1 CM^2
BH CV ECHO MEAS - LVOT DIAM: 2.3 CM
BH CV ECHO MEAS - LVPWD: 0.86 CM
BH CV ECHO MEAS - MED PEAK E' VEL: 9.4 CM/SEC
BH CV ECHO MEAS - MV A MAX VEL: 42 CM/SEC
BH CV ECHO MEAS - MV E MAX VEL: 75.5 CM/SEC
BH CV ECHO MEAS - MV E/A: 1.8
BH CV ECHO MEAS - PA ACC SLOPE: 492.8 CM/SEC^2
BH CV ECHO MEAS - PA ACC TIME: 0.15 SEC
BH CV ECHO MEAS - PA PR(ACCEL): 13.6 MMHG
BH CV ECHO MEAS - RAP SYSTOLE: 8 MMHG
BH CV ECHO MEAS - RVDD: 2.8 CM
BH CV ECHO MEAS - RVSP: 22 MMHG
BH CV ECHO MEAS - SI(CUBED): 31.4 ML/M^2
BH CV ECHO MEAS - SI(LVOT): 32.2 ML/M^2
BH CV ECHO MEAS - SI(MOD-SP2): 21.4 ML/M^2
BH CV ECHO MEAS - SI(MOD-SP4): 21.4 ML/M^2
BH CV ECHO MEAS - SI(TEICH): 27.4 ML/M^2
BH CV ECHO MEAS - SV(CUBED): 68.8 ML
BH CV ECHO MEAS - SV(LVOT): 70.6 ML
BH CV ECHO MEAS - SV(MOD-SP2): 47 ML
BH CV ECHO MEAS - SV(MOD-SP4): 47 ML
BH CV ECHO MEAS - SV(TEICH): 60.1 ML
BH CV ECHO MEAS - TAPSE (>1.6): 1.8 CM2
BH CV ECHO MEAS - TR MAX VEL: 187.3 CM/SEC
BH CV ECHO MEASUREMENTS AVERAGE E/E' RATIO: 8.39
BH CV VAS BP LEFT ARM: NORMAL MMHG
BH CV XLRA - RV BASE: 3.6 CM
BH CV XLRA - RV LENGTH: 7.5 CM
BH CV XLRA - RV MID: 2.8 CM
BUN BLD-MCNC: 14 MG/DL (ref 9–23)
BUN/CREAT SERPL: 14.4 (ref 7–25)
CALCIUM SPEC-SCNC: 8.6 MG/DL (ref 8.7–10.4)
CHLORIDE SERPL-SCNC: 110 MMOL/L (ref 99–109)
CO2 SERPL-SCNC: 26 MMOL/L (ref 20–31)
CREAT BLD-MCNC: 0.97 MG/DL (ref 0.6–1.3)
DEPRECATED RDW RBC AUTO: 43.3 FL (ref 37–54)
ERYTHROCYTE [DISTWIDTH] IN BLOOD BY AUTOMATED COUNT: 13.6 % (ref 11.3–14.5)
GFR SERPL CREATININE-BSD FRML MDRD: 75 ML/MIN/1.73
GLUCOSE BLD-MCNC: 82 MG/DL (ref 70–100)
HCT VFR BLD AUTO: 43 % (ref 38.9–50.9)
HGB BLD-MCNC: 14.1 G/DL (ref 13.1–17.5)
LEFT ATRIUM VOLUME INDEX: 32 ML/M2
LV EF 2D ECHO EST: 60 %
MCH RBC QN AUTO: 28.7 PG (ref 27–31)
MCHC RBC AUTO-ENTMCNC: 32.8 G/DL (ref 32–36)
MCV RBC AUTO: 87.6 FL (ref 80–99)
PLATELET # BLD AUTO: 161 10*3/MM3 (ref 150–450)
PMV BLD AUTO: 11 FL (ref 6–12)
POTASSIUM BLD-SCNC: 3.9 MMOL/L (ref 3.5–5.5)
RBC # BLD AUTO: 4.91 10*6/MM3 (ref 4.2–5.76)
SODIUM BLD-SCNC: 141 MMOL/L (ref 132–146)
WBC NRBC COR # BLD: 8.04 10*3/MM3 (ref 3.5–10.8)

## 2018-08-05 PROCEDURE — 99232 SBSQ HOSP IP/OBS MODERATE 35: CPT | Performed by: FAMILY MEDICINE

## 2018-08-05 PROCEDURE — 85027 COMPLETE CBC AUTOMATED: CPT | Performed by: INTERNAL MEDICINE

## 2018-08-05 PROCEDURE — 80048 BASIC METABOLIC PNL TOTAL CA: CPT | Performed by: INTERNAL MEDICINE

## 2018-08-05 PROCEDURE — 93306 TTE W/DOPPLER COMPLETE: CPT | Performed by: INTERNAL MEDICINE

## 2018-08-05 PROCEDURE — 93306 TTE W/DOPPLER COMPLETE: CPT

## 2018-08-05 PROCEDURE — 99232 SBSQ HOSP IP/OBS MODERATE 35: CPT | Performed by: INTERNAL MEDICINE

## 2018-08-05 RX ADMIN — CLOPIDOGREL BISULFATE 75 MG: 75 TABLET ORAL at 08:07

## 2018-08-05 RX ADMIN — METOPROLOL TARTRATE 12.5 MG: 25 TABLET, FILM COATED ORAL at 09:16

## 2018-08-05 RX ADMIN — RIVAROXABAN 20 MG: 20 TABLET, FILM COATED ORAL at 18:03

## 2018-08-05 RX ADMIN — ASPIRIN 81 MG: 81 TABLET, COATED ORAL at 08:07

## 2018-08-05 RX ADMIN — DESMOPRESSIN ACETATE 40 MG: 0.2 TABLET ORAL at 20:13

## 2018-08-05 RX ADMIN — METOPROLOL TARTRATE 12.5 MG: 25 TABLET, FILM COATED ORAL at 20:13

## 2018-08-05 NOTE — PROGRESS NOTES
[facility] Medicine Services  PROGRESS NOTE    Patient Name: Palomo Rainey  : 1940  MRN: [id number]    Date of Admission: 2018  Length of Stay: 1  Primary Care Physician: Graciela Dodson MD    Subjective   Subjective     CC:  Chest pain     HPI:  Pt sitting up in bed making jokes. Wife at bedside. Plans on home tomorrow. Denies chest pain.     Review of Systems  Gen- No fevers, chills, brusies  CV- No chest pain, palpitations  Resp- No cough, dyspnea  GI- No N/V/D, abd pain      Otherwise ROS is negative except as mentioned in the HPI.    Objective   Objective     Vital Signs:   Temp:  [97.6 °F (36.4 °C)-98.2 °F (36.8 °C)] 97.6 °F (36.4 °C)  Heart Rate:  [58-90] 88  Resp:  [14-18] 16  BP: ()/(62-90) 99/78        Physical Exam:  Constitutional: No acute distress, awake, alert  HENT: NCAT, mucous membranes moist  Respiratory: Clear to auscultation bilaterally, respiratory effort normal   Cardiovascular: RRR, no murmurs, rubs, or gallops, palpable pedal pulses bilaterally  Gastrointestinal: Positive bowel sounds, soft, nontender, nondistended  Musculoskeletal: No bilateral ankle edema,   Psychiatric: Appropriate affect, cooperative  Neurologic: Oriented x 3, strength symmetric in all extremities, Cranial Nerves grossly intact to confrontation, speech clear  Skin: No rashes      Results Reviewed:  I have personally reviewed current lab, radiology, and data and agree.      Results from last 7 days  Lab Units 18  0618  031   WBC 10*3/mm3 8.04  --  5.85   HEMOGLOBIN g/dL 14.1  --  13.6   HEMATOCRIT % 43.0  --  41.8   PLATELETS 10*3/mm3 161  --  179   INR   --  1.10*  --        Results from last 7 days  Lab Units 18  0614 18  08188   SODIUM mmol/L 141  --  142   POTASSIUM mmol/L 3.9  --  3.9   CHLORIDE mmol/L 110*  --  107   CO2 mmol/L 26.0  --  30.0   BUN mg/dL 14  --  18   CREATININE mg/dL 0.97  --  1.09   GLUCOSE  mg/dL 82  --  95   CALCIUM mg/dL 8.6*  --  9.4   ALT (SGPT) U/L  --   --  16   AST (SGOT) U/L  --   --  22   TROPONIN I ng/mL  --  0.779* 0.407*     CrCl cannot be calculated (Unknown ideal weight.).  BNP   Date Value Ref Range Status   08/04/2018 50.0 0.0 - 100.0 pg/mL Final     Comment:     Results may be falsely decreased if patient taking Biotin.       Microbiology Results Abnormal     None          Imaging Results (last 24 hours)     Procedure Component Value Units Date/Time    XR Chest 1 View [[id number]] Collected:  08/04/18 0704     Updated:  08/04/18 1309    Narrative:          EXAMINATION: XR CHEST 1 VW - 8/4/2018     INDICATION: Chest pain.     COMPARISON: 12/8/2015.     FINDINGS: Cardiac size within normal limits. Minimal bibasilar  atelectasis and/or scarring without focal consolidation. No pneumothorax  or significant pleural effusion. Degenerative changes of the spine.           Impression:       Mild chronic lung changes, however, no acute cardiopulmonary  process. Specifically no overt edema.     DICTATED:   8/4/2018  EDITED/ls :   8/4/2018      This report was finalized on 8/4/2018 1:07 PM by Dr. Jeremías Masters.                I have reviewed the medications.    Assessment/Plan   Assessment / Plan     Hospital Problem List     * (Principal)NSTEMI (non-ST elevated myocardial infarction) (CMS/HCC)    Essential hypertension    History of CVA (cerebrovascular accident)    Chest pain             Brief Hospital Course to date:  Palomo Rainey is a 78 y.o. male with a history of HTN, CVA, was transferred from [facility] with complaints of chest pain, found to have NSTEMI, Cardiology following,       Assessment & Plan:  -NSTEMI, chest pain syndrome, no prior history, risk factors include Age, HTN, prior tobacco, prior CVA  -Paroxysmal atrial fibrillation during heart cath, spontaneously converted this AM. ~20 hrs of AFib  -HTN  -Prior CVA     PLAN:  -Echo reviewed-EF 60%  -Continue DAPT,  statin, beta blocker, Xarelto per cards   -If remains in sinus, plan to place on Event Monitor tomorrow for 30 days and temporarily hold Xarelto. If with recurrent AFib, will then discontinue ASA and restart Xarelto  -Holding lisinopril  -Switched coreg to metoprolol  - home tomorrow likely per Cards     DVT Prophylaxis:      CODE STATUS:   Code Status and Medical Interventions:   Ordered at: 08/04/18 1301     Level Of Support Discussed With:    Patient     Code Status:    CPR     Medical Interventions (Level of Support Prior to Arrest):    Full       Disposition: I expect the patient to be discharged home in am       Electronically signed by Magaly Prajapati DO, 08/05/18, 11:08 AM.

## 2018-08-05 NOTE — PROGRESS NOTES
[facility] at [facility]    Inpatient Progress Note      Chief Complaint/Reason for service:    · Chest pain, NSTEMI         Subjective:       No chest pain. Wrist without pain but with ecchymosis, no affect on range of motion or sensation    Past medical, surgical, social and family history reviewed in the patient's electronic medical record.    Review of Systems:   Positive for ecchymosis  Negative for exertional chest pain, dyspnea with exertion, lower extremity edema, palpitations     Problem List  Active Hospital Problems    Diagnosis Date Noted   • **NSTEMI (non-ST elevated myocardial infarction) (CMS/[facility]) [I21.4] 08/04/2018   • Essential hypertension [I10] 08/04/2018   • History of CVA (cerebrovascular accident) [Z86.73] 08/04/2018   • Chest pain [R07.9] 08/04/2018      Resolved Hospital Problems    Diagnosis Date Noted Date Resolved   No resolved problems to display.            Objective:      Current Facility-Administered Medications:   •  acetaminophen (TYLENOL) tablet 650 mg, 650 mg, Oral, Q4H PRN, Meliton Cantu MD  •  aspirin EC tablet 81 mg, 81 mg, Oral, Daily, Meliton Cantu MD, 81 mg at 08/05/18 0807  •  atorvastatin (LIPITOR) tablet 40 mg, 40 mg, Oral, Nightly, Meliton Cantu MD, 40 mg at 08/04/18 2023  •  carvedilol (COREG) tablet 6.25 mg, 6.25 mg, Oral, Q12H, Meliton Cantu MD  •  clopidogrel (PLAVIX) tablet 75 mg, 75 mg, Oral, Daily, Natalie Weinstein APRN, 75 mg at 08/05/18 0807  •  lisinopril (PRINIVIL,ZESTRIL) tablet 20 mg, 20 mg, Oral, Daily, Natalie Weinstein APRN, 20 mg at 08/04/18 0916  •  nitroglycerin (NITROSTAT) SL tablet 0.4 mg, 0.4 mg, Sublingual, Q5 Min PRN, Natalie Weinstein APRN  •  ondansetron (ZOFRAN) injection 4 mg, 4 mg, Intravenous, Q6H PRN, Natalie Weinstein APREZEQUIEL  •  ondansetron (ZOFRAN) tablet 4 mg, 4 mg, Oral, Q6H PRN **OR** ondansetron (ZOFRAN) injection 4 mg, 4 mg, Intravenous, Q6H PRN, Meliton Cantu MD  •  rivaroxaban (XARELTO)  tablet 20 mg, 20 mg, Oral, Daily With Dinner, Meliton Cantu MD, 20 mg at 08/04/18 1756  •  sodium chloride 0.9 % flush 1-10 mL, 1-10 mL, Intravenous, PRN, Natalie Weinstein, APRN    Vital Sign Min/Max for last 24 hours  Temp  Min: 97.6 °F (36.4 °C)  Max: 98.2 °F (36.8 °C)   BP  Min: 93/62  Max: 158/84   Pulse  Min: 58  Max: 90   Resp  Min: 14  Max: 18   SpO2  Min: 91 %  Max: 98 %   No Data Recorded      Intake/Output Summary (Last 24 hours) at 08/05/18 0834  Last data filed at 08/04/18 1823   Gross per 24 hour   Intake              480 ml   Output                0 ml   Net              480 ml           CONSTITUTIONAL: No acute distress, normal affect  RESPIRATORY: Normal effort. Clear to auscultation bilaterally without wheezing or rales  CARDIOVASCULAR: Regular rate and rhythm with normal S1 and S2. Without murmur, gallop or rub.  PERIPHERAL VASCULAR: Normal radial pulses bilaterally. There is no peripheral edema bilaterally. Right radial artery with ecchymosis    Results Review:   Lab Results   Component Value Date    TROPONINI 0.779 (H) 08/04/2018       BUN   Date Value Ref Range Status   08/05/2018 14 9 - 23 mg/dL Final     Creatinine   Date Value Ref Range Status   08/05/2018 0.97 0.60 - 1.30 mg/dL Final     Potassium   Date Value Ref Range Status   08/05/2018 3.9 3.5 - 5.5 mmol/L Final     ALT (SGPT)   Date Value Ref Range Status   08/04/2018 16 7 - 40 U/L Final     AST (SGOT)   Date Value Ref Range Status   08/04/2018 22 0 - 33 U/L Final       Lab Results   Component Value Date    CHOL 169 08/04/2018     Lab Results   Component Value Date    TRIG 42 08/04/2018     Lab Results   Component Value Date    HDL 37 (L) 08/04/2018     No components found for: LDLCALC  No results found for: VLDL  No results found for: LDLHDL    Tele:  [facility] 8/2018  · There was severe 1 vessel coronary artery disease involving a 90% discrete mid circumflex stenosis followed by multiple intermediate lesions extending into the  first obtuse marginal branch.  These lesions are status post intervention with a Xience Sydni 2.25 x 38 mm drug-eluting stent with post dilatation of the proximal segment with a 3.0 mm diameter balloon.  The bifurcation of the first and second obtuse marginal branches were then treated with a kissing balloon inflation.  · LVEF 60% with mild hypokinesis of the basal to mid anterolateral segment  · The patient went into atrial fibrillation during the procedure.  Rate controlled with heart rate ranging from 60-80 bpm. I suspect the patient has been going in and out of atrial fibrillation at home since the family has noted specifically that his rate has jumped from 60-80 intermittently over the last couple weeks.  When he went into atrial fibrillation here today, his heart rate was in the 80s..            Assessment/Plan:     ASSESSMENT:  -NSTEMI, chest pain syndrome, no prior history, risk factors include Age, HTN, prior tobacco, prior CVA  -Paroxysmal atrial fibrillation during heart cath, spontaneously converted this AM. ~20 hrs of AFib  -HTN  -Prior CVA    PLAN:  -Echo still pending  -Continue DAPT, statin, beta blocker, Xarelto  -If remains in sinus, will place Event Monitor on tomorrow for 30 days and temporarily hold Xarelto. If with recurrent AFib, will then discontinue ASA and restart Xarelto  -Hold lisinopril for now due to relative hypotension  -Switch carvedilol to metoprolol  -Anticipate home tomorrow    Meliton Cantu MD, MSc, [facility]  Interventional Cardiology  [facility]  8/5/2018

## 2018-08-05 NOTE — PLAN OF CARE
Problem: Patient Care Overview  Goal: Plan of Care Review  Outcome: Ongoing (interventions implemented as appropriate)   08/05/18 6198   Coping/Psychosocial   Plan of Care Reviewed With patient   OTHER   Outcome Summary vss. converted to nsr between 6314-3411 today. may be discharge home to [place]. n o c/p's of chest pain noted at this time.       Problem: Cardiac: ACS (Acute Coronary Syndrome) (Adult)  Goal: Signs and Symptoms of Listed Potential Problems Will be Absent, Minimized or Managed (Cardiac: ACS)  Outcome: Ongoing (interventions implemented as appropriate)      Problem: Arrhythmia/Dysrhythmia (Symptomatic) (Adult)  Goal: Signs and Symptoms of Listed Potential Problems Will be Absent, Minimized or Managed (Arrhythmia/Dysrhythmia)  Outcome: Outcome(s) achieved Date Met: 08/05/18

## 2018-08-06 VITALS
DIASTOLIC BLOOD PRESSURE: 90 MMHG | TEMPERATURE: 98.2 F | OXYGEN SATURATION: 95 % | RESPIRATION RATE: 18 BRPM | WEIGHT: 204.8 LBS | SYSTOLIC BLOOD PRESSURE: 158 MMHG | HEART RATE: 79 BPM

## 2018-08-06 PROBLEM — I48.91 ATRIAL FIBRILLATION, TRANSIENT (HCC): Status: ACTIVE | Noted: 2018-08-06

## 2018-08-06 LAB
BASOPHILS # BLD AUTO: 0.01 10*3/MM3 (ref 0–0.2)
BASOPHILS NFR BLD AUTO: 0.1 % (ref 0–1)
DEPRECATED RDW RBC AUTO: 43 FL (ref 37–54)
EOSINOPHIL # BLD AUTO: 0.31 10*3/MM3 (ref 0–0.3)
EOSINOPHIL NFR BLD AUTO: 4.3 % (ref 0–3)
ERYTHROCYTE [DISTWIDTH] IN BLOOD BY AUTOMATED COUNT: 13.6 % (ref 11.3–14.5)
HCT VFR BLD AUTO: 42.3 % (ref 38.9–50.9)
HGB BLD-MCNC: 13.9 G/DL (ref 13.1–17.5)
IMM GRANULOCYTES # BLD: 0.01 10*3/MM3 (ref 0–0.03)
IMM GRANULOCYTES NFR BLD: 0.1 % (ref 0–0.6)
LYMPHOCYTES # BLD AUTO: 1.62 10*3/MM3 (ref 0.6–4.8)
LYMPHOCYTES NFR BLD AUTO: 22.2 % (ref 24–44)
MCH RBC QN AUTO: 28.6 PG (ref 27–31)
MCHC RBC AUTO-ENTMCNC: 32.9 G/DL (ref 32–36)
MCV RBC AUTO: 87 FL (ref 80–99)
MONOCYTES # BLD AUTO: 0.87 10*3/MM3 (ref 0–1)
MONOCYTES NFR BLD AUTO: 11.9 % (ref 0–12)
NEUTROPHILS # BLD AUTO: 4.48 10*3/MM3 (ref 1.5–8.3)
NEUTROPHILS NFR BLD AUTO: 61.5 % (ref 41–71)
PLATELET # BLD AUTO: 167 10*3/MM3 (ref 150–450)
PMV BLD AUTO: 11.3 FL (ref 6–12)
RBC # BLD AUTO: 4.86 10*6/MM3 (ref 4.2–5.76)
WBC NRBC COR # BLD: 7.29 10*3/MM3 (ref 3.5–10.8)

## 2018-08-06 PROCEDURE — 99239 HOSP IP/OBS DSCHRG MGMT >30: CPT | Performed by: NURSE PRACTITIONER

## 2018-08-06 PROCEDURE — 85025 COMPLETE CBC W/AUTO DIFF WBC: CPT | Performed by: NURSE PRACTITIONER

## 2018-08-06 PROCEDURE — 99232 SBSQ HOSP IP/OBS MODERATE 35: CPT | Performed by: INTERNAL MEDICINE

## 2018-08-06 RX ORDER — CARVEDILOL 6.25 MG/1
6.25 TABLET ORAL EVERY 12 HOURS SCHEDULED
Status: DISCONTINUED | OUTPATIENT
Start: 2018-08-06 | End: 2018-08-06 | Stop reason: HOSPADM

## 2018-08-06 RX ORDER — ATORVASTATIN CALCIUM 40 MG/1
40 TABLET, FILM COATED ORAL NIGHTLY
Qty: 30 TABLET | Refills: 11 | Status: SHIPPED | OUTPATIENT
Start: 2018-08-06 | End: 2019-03-11

## 2018-08-06 RX ORDER — CLOPIDOGREL BISULFATE 75 MG/1
75 TABLET ORAL DAILY
Qty: 30 TABLET | Refills: 11 | Status: SHIPPED | OUTPATIENT
Start: 2018-08-06 | End: 2019-03-11

## 2018-08-06 RX ORDER — CARVEDILOL 6.25 MG/1
6.25 TABLET ORAL EVERY 12 HOURS SCHEDULED
Qty: 60 TABLET | Refills: 11 | Status: SHIPPED | OUTPATIENT
Start: 2018-08-06 | End: 2019-09-23

## 2018-08-06 RX ORDER — NITROGLYCERIN 0.4 MG/1
0.4 TABLET SUBLINGUAL
Qty: 25 TABLET | Refills: 3 | Status: SHIPPED | OUTPATIENT
Start: 2018-08-06 | End: 2021-06-30 | Stop reason: HOSPADM

## 2018-08-06 RX ADMIN — CLOPIDOGREL BISULFATE 75 MG: 75 TABLET ORAL at 08:49

## 2018-08-06 RX ADMIN — ASPIRIN 81 MG: 81 TABLET, COATED ORAL at 08:49

## 2018-08-06 RX ADMIN — CARVEDILOL 6.25 MG: 6.25 TABLET, FILM COATED ORAL at 08:55

## 2018-08-06 NOTE — PROGRESS NOTES
[facility] at [facility]    Inpatient Progress Note      Chief Complaint/Reason for service:    · Chest pain, NSTEMI         Subjective:       Patient resting in bed.  No chest pain or shortness of breath.  Denies pain at left radial access site.    Past medical, surgical, social and family history reviewed in the patient's electronic medical record.    Review of Systems:   Positive for ecchymosis  Negative for exertional chest pain, dyspnea with exertion, lower extremity edema, palpitations     Problem List  Active Hospital Problems    Diagnosis Date Noted   • **NSTEMI (non-ST elevated myocardial infarction) (CMS/[facility]) [I21.4] 08/04/2018   • Essential hypertension [I10] 08/04/2018   • History of CVA (cerebrovascular accident) [Z86.73] 08/04/2018   • Chest pain [R07.9] 08/04/2018      Resolved Hospital Problems    Diagnosis Date Noted Date Resolved   No resolved problems to display.            Objective:      Current Facility-Administered Medications:   •  acetaminophen (TYLENOL) tablet 650 mg, 650 mg, Oral, Q4H PRN, Meliton Cantu MD  •  aspirin EC tablet 81 mg, 81 mg, Oral, Daily, Meliton Cantu MD, 81 mg at 08/05/18 0807  •  atorvastatin (LIPITOR) tablet 40 mg, 40 mg, Oral, Nightly, Meliton Cantu MD, 40 mg at 08/05/18 2013  •  clopidogrel (PLAVIX) tablet 75 mg, 75 mg, Oral, Daily, Natalie Weinstein APRN, 75 mg at 08/05/18 0807  •  metoprolol tartrate (LOPRESSOR) half tablet 12.5 mg, 12.5 mg, Oral, Q12H, Meliton Cantu MD, 12.5 mg at 08/05/18 2013  •  nitroglycerin (NITROSTAT) SL tablet 0.4 mg, 0.4 mg, Sublingual, Q5 Min PRN, Natalie Weinstein, APRN  •  ondansetron (ZOFRAN) injection 4 mg, 4 mg, Intravenous, Q6H PRN, Natalie Weinstein, APRN  •  ondansetron (ZOFRAN) tablet 4 mg, 4 mg, Oral, Q6H PRN **OR** ondansetron (ZOFRAN) injection 4 mg, 4 mg, Intravenous, Q6H PRN, Meliton Cantu MD  •  rivaroxaban (XARELTO) tablet 20 mg, 20 mg, Oral, Daily With Dinner, Meliton Cantu MD, 20 mg  at 08/05/18 1803  •  sodium chloride 0.9 % flush 1-10 mL, 1-10 mL, Intravenous, PRN, [place], Natalie W, APRN    Vital Sign Min/Max for last 24 hours  Temp  Min: 97.6 °F (36.4 °C)  Max: 98.5 °F (36.9 °C)   BP  Min: 101/66  Max: 140/86   Pulse  Min: 76  Max: 86   Resp  Min: 12  Max: 16   No Data Recorded   No Data Recorded      Intake/Output Summary (Last 24 hours) at 08/06/18 0756  Last data filed at 08/05/18 1801   Gross per 24 hour   Intake             1080 ml   Output              550 ml   Net              530 ml           CONSTITUTIONAL: No acute distress, normal affect  RESPIRATORY: Normal effort. Clear to auscultation bilaterally without wheezing or rales  CARDIOVASCULAR: Regular rate and rhythm with normal S1 and S2. Without murmur, gallop or rub.  PERIPHERAL VASCULAR: Normal radial pulses bilaterally. There is no peripheral edema bilaterally. Right radial artery with ecchymosis.    Results Review:   Lab Results   Component Value Date    TROPONINI 0.779 (H) 08/04/2018       BUN   Date Value Ref Range Status   08/05/2018 14 9 - 23 mg/dL Final     Creatinine   Date Value Ref Range Status   08/05/2018 0.97 0.60 - 1.30 mg/dL Final     Potassium   Date Value Ref Range Status   08/05/2018 3.9 3.5 - 5.5 mmol/L Final     ALT (SGPT)   Date Value Ref Range Status   08/04/2018 16 7 - 40 U/L Final     AST (SGOT)   Date Value Ref Range Status   08/04/2018 22 0 - 33 U/L Final       Lab Results   Component Value Date    CHOL 169 08/04/2018     Lab Results   Component Value Date    TRIG 42 08/04/2018     Lab Results   Component Value Date    HDL 37 (L) 08/04/2018     No components found for: LDLCALC  No results found for: VLDL  No results found for: LDLHDL    Tele:  NSR    TTE 8/5/2018  · Left ventricular systolic function is normal. Estimated EF = 60%.  · All left ventricular wall segments contract normally.  · Left ventricular diastolic function was indeterminate.    [facility] 8/2018  · There was severe 1 vessel coronary  artery disease involving a 90% discrete mid circumflex stenosis followed by multiple intermediate lesions extending into the first obtuse marginal branch.  These lesions are status post intervention with a Xience Sydni 2.25 x 38 mm drug-eluting stent with post dilatation of the proximal segment with a 3.0 mm diameter balloon.  The bifurcation of the first and second obtuse marginal branches were then treated with a kissing balloon inflation.  · LVEF 60% with mild hypokinesis of the basal to mid anterolateral segment  · The patient went into atrial fibrillation during the procedure.  Rate controlled with heart rate ranging from 60-80 bpm. I suspect the patient has been going in and out of atrial fibrillation at home since the family has noted specifically that his rate has jumped from 60-80 intermittently over the last couple weeks.  When he went into atrial fibrillation here today, his heart rate was in the 80s..            Assessment/Plan:     ASSESSMENT:  -NSTEMI, chest pain syndrome, no prior history, risk factors include Age, HTN, prior tobacco, prior CVA  -Paroxysmal atrial fibrillation during heart cath, spontaneously converted 8/5/2018. ~20 hrs of AFib  -HTN  -Prior CVA    PLAN:  -Okay to d/c from a cardiac standpoint.  -No recurrence of atrial fibrillation overnight will hold Xarelto and place 30 day event monitor prior to D/C.  -Continue DAPT, statin, beta blocker.  -Cardiac rehabilitation referral.  -Follow-up with Dr. Cantu in 1 month in the cardiology clinic.     VALERIA Mueller obtained past medical, family history, social history, review of systems and functioned as a scribe for the remainder of the dictation for Dr. Cantu.   8/6/2018    I, Meliton Cantu MD, personally performed the services as scribed by the above named individual. I have made any necessary edits and it is both accurate and complete.     Meliton Cantu MD, MSc, [facility]  Interventional Cardiology  [facility] [place]  [place]

## 2018-08-06 NOTE — PLAN OF CARE
Problem: Patient Care Overview  Goal: Plan of Care Review  Outcome: Outcome(s) achieved Date Met: 08/06/18 08/06/18 1107   OTHER   Outcome Summary pt. being discharge home today     Goal: Individualization and Mutuality  Outcome: Outcome(s) achieved Date Met: 08/06/18    Goal: Discharge Needs Assessment  Outcome: Outcome(s) achieved Date Met: 08/06/18 08/06/18 1107   Discharge Needs Assessment   Readmission Within the Last 30 Days no previous admission in last 30 days   Concerns to be Addressed no discharge needs identified   Patient/Family Anticipates Transition to home   Patient/Family Anticipated Services at Transition none   Transportation Concerns car, none   Discharge Needs Assessment,    Anticipated Discharge Disposition home or self-care     Goal: Interprofessional Rounds/Family Conf  Outcome: Outcome(s) achieved Date Met: 08/06/18      Problem: Cardiac: ACS (Acute Coronary Syndrome) (Adult)  Goal: Signs and Symptoms of Listed Potential Problems Will be Absent, Minimized or Managed (Cardiac: ACS)  Outcome: Outcome(s) achieved Date Met: 08/06/18

## 2018-08-06 NOTE — DISCHARGE SUMMARY
[facility] Hospital Medicine Services  DISCHARGE SUMMARY    Patient Name: Palomo Rainey  : 1940  MRN: [id number]    Date of Admission: 2018  Date of Discharge:  2018  Primary Care Physician: Graciela Dodson MD    Consults     Date and Time Order Name Status Description    2018 0335 Inpatient Cardiology Consult Completed         Hospital Course     Presenting Problem:   NSTEMI (non-ST elevated myocardial infarction) (CMS/[facility]) [I21.4]  Chest pain [R07.9]  NSTEMI (non-ST elevated myocardial infarction) (CMS/HCC) [I21.4]    Active Hospital Problems    Diagnosis Date Noted   • **NSTEMI (non-ST elevated myocardial infarction) (CMS/[facility]) [I21.4] 2018   • Atrial fibrillation, transient (CMS/[facility]) [I48.91] 2018   • Essential hypertension [I10] 2018   • History of CVA (cerebrovascular accident) [Z86.73] 2018   • Chest pain [R07.9] 2018      Resolved Hospital Problems    Diagnosis Date Noted Date Resolved   No resolved problems to display.          Hospital Course:  Palomo Rainey is a 78 y.o. male with a history of HTN and CVA who was transferred from [facility] with complaints of chst pain that started around 1900.  He reported pain in His central chest area that radiated to his neck.  He described the pain as pressure with associated mild shortness of air and nausea.  Initially he thought he was having gas pain and took approximately 12 Tums, which did not help his symptoms.The patient reported 3 separate episodes of chest pain on the night of presentation, all of which occurred at rest.  At the outside facility, he did have a mildly elevated troponin and was given aspirin, 600 mg of Plavix, and started on heparin drip.  He was transferred to [facility] for further evaluation and treatment.  He was initiated on a nitroglycerin drip.  He was seen in consultation by Dr. Cantu with cardiology and taken for a left heart  cath. He was noted to have severe 1 vessel coronary artery disease involving a 90% discrete mid circumflex stenosis. he underwent drug eluting stent placement.  The patient was noted to have gone into atrial fibrillation during the procedure.  His rate was noted to be controlled with a heart rate ranging from 60-80 bpm.  The patient was initiated on Xarelto on the evening of his heart cath.He converted back to sinus rhythm and had no further recurrence of atrial fibrillation overnight.  His Xarelto was held and a 30 day event monitor was placed prior to discharge.  He will follow-up with Dr. Cantu in 1 month in the cardiology clinic and will continue off of Xarelto until his follow-up.  He has also been placed on dual antiplatelet therapy.  The patient should also follow-up with his PCP at their first available hospital follow-up appointment.    Day of Discharge     HPI:   Resting comfortably in bed with family at bedside.  No new complaints.  Wants to go home.     Review of Systems  Gen- No fevers, chills  CV- No chest pain, palpitations  Resp- No cough, dyspnea  GI- No N/V/D, abd pain      Otherwise ROS is negative except as mentioned in the HPI.    Vital Signs:   Temp:  [98.2 °F (36.8 °C)-98.5 °F (36.9 °C)] 98.2 °F (36.8 °C)  Heart Rate:  [77-86] 79  Resp:  [12-18] 18  BP: (120-158)/(76-90) 158/90     Physical Exam:  Constitutional: No acute distress, awake, alert, resting in bed, family at bedside  HENT: NCAT, mucous membranes moist  Respiratory: Clear to auscultation bilaterally, respiratory effort normal   Cardiovascular: RRR, no murmurs, rubs, or gallops, palpable pedal pulses bilaterally  Gastrointestinal: Positive bowel sounds, soft, nontender, nondistended  Musculoskeletal: No bilateral ankle edema  Psychiatric: Appropriate affect, cooperative  Neurologic: Oriented x 3, strength symmetric in all extremities, Cranial Nerves grossly intact to confrontation, speech clear  Skin: No rashes      Pertinent  and/or  Most Recent Results       Results from last 7 days  Lab Units 08/06/18  0444 08/05/18  0614 08/04/18  0318   WBC 10*3/mm3 7.29 8.04 5.85   HEMOGLOBIN g/dL 13.9 14.1 13.6   HEMATOCRIT % 42.3 43.0 41.8   PLATELETS 10*3/mm3 167 161 179   SODIUM mmol/L  --  141 142   POTASSIUM mmol/L  --  3.9 3.9   CHLORIDE mmol/L  --  110* 107   CO2 mmol/L  --  26.0 30.0   BUN mg/dL  --  14 18   CREATININE mg/dL  --  0.97 1.09   GLUCOSE mg/dL  --  82 95   CALCIUM mg/dL  --  8.6* 9.4       Results from last 7 days  Lab Units 08/04/18  0823 08/04/18 0319 08/04/18  0318   BILIRUBIN mg/dL  --   --  0.6   ALK PHOS U/L  --   --  50   ALT (SGPT) U/L  --   --  16   AST (SGOT) U/L  --   --  22   PROTIME Seconds  --  11.6*  --    INR   --  1.10*  --    APTT seconds 47.0* 69.6*  --        Results from last 7 days  Lab Units 08/04/18  0318   CHOLESTEROL mg/dL 169   TRIGLYCERIDES mg/dL 42   HDL CHOL mg/dL 37*       Results from last 7 days  Lab Units 08/04/18  0823 08/04/18 0318   TSH mIU/mL  --  7.604*   HEMOGLOBIN A1C %  --  5.60   BNP pg/mL  --  50.0   TROPONIN I ng/mL 0.779* 0.407*     Brief Urine Lab Results     None          Microbiology Results Abnormal     None          Imaging Results (all)     Procedure Component Value Units Date/Time    XR Chest 1 View [[id number]] Collected:  08/04/18 0704     Updated:  08/04/18 1309    Narrative:          EXAMINATION: XR CHEST 1 VW - 8/4/2018     INDICATION: Chest pain.     COMPARISON: 12/8/2015.     FINDINGS: Cardiac size within normal limits. Minimal bibasilar  atelectasis and/or scarring without focal consolidation. No pneumothorax  or significant pleural effusion. Degenerative changes of the spine.           Impression:       Mild chronic lung changes, however, no acute cardiopulmonary  process. Specifically no overt edema.     DICTATED:   8/4/2018  EDITED/ls :   8/4/2018      This report was finalized on 8/4/2018 1:07 PM by Dr. Jeremías Masters.                       Results for orders placed during  the hospital encounter of 08/04/18   Adult Transthoracic Echo Complete W/ Cont if Necessary Per Protocol    Narrative · Left ventricular systolic function is normal. Estimated EF = 60%.  · All left ventricular wall segments contract normally.  · Left ventricular diastolic function was indeterminate.            Discharge Details        Discharge Medications      New Medications      Instructions Start Date   atorvastatin 40 MG tablet  Commonly known as:  LIPITOR  Notes to patient:  Lowers cholesterol.   40 mg, Oral, Nightly      carvedilol 6.25 MG tablet  Commonly known as:  COREG  Notes to patient:  For heart, blood pressure and heart rate.   6.25 mg, Oral, Every 12 Hours Scheduled      clopidogrel 75 MG tablet  Commonly known as:  PLAVIX  Notes to patient:  For heart, prevents abnormal blood clots.   75 mg, Oral, Daily      nitroglycerin 0.4 MG SL tablet  Commonly known as:  NITROSTAT   0.4 mg, Sublingual, Every 5 Minutes PRN, Take no more than 3 doses in 15 minutes.         Continue These Medications      Instructions Start Date   aspirin 81 MG EC tablet  Notes to patient:  For heart, prevents abnormal blood clots.   81 mg, Oral, Daily         Stop These Medications    lisinopril 20 MG tablet  Commonly known as:  PRINIVIL,ZESTRIL              Discharge Disposition:  Home or Self Care    Discharge Diet:  Diet Instructions     Diet: Cardiac; Thin       Discharge Diet:  Cardiac    Fluid Consistency:  Thin          Discharge Activity:   Activity Instructions     Activity as Tolerated             Code Status/Level of Support:  Code Status and Medical Interventions:   Ordered at: 08/04/18 1301     Level Of Support Discussed With:    Patient     Code Status:    CPR     Medical Interventions (Level of Support Prior to Arrest):    Full       Future Appointments  Date Time Provider Department Center   9/6/2018 9:15 AM Lizz Yan APRN [facility]   9/10/2018 2:30 PM Meliton Cantu MD [facility] None        Additional Instructions for the Follow-ups that You Need to Schedule     Discharge Follow-up with PCP    As directed      Currently Documented PCP:  Graciela Dodson MD  PCP Phone Number:  [phone]    Follow Up Details:  first available hospital follow up         Discharge Follow-up with Specified Provider: Dr. Cantu; 1 Month    As directed      To:  Dr. Cantu    Follow Up:  1 Month               Time Spent on Discharge:  40 minutes    Electronically signed by VALERIA Graham, 08/06/18, 2:14 PM.

## 2018-08-07 ENCOUNTER — READMISSION MANAGEMENT (OUTPATIENT)
Dept: CALL CENTER | Facility: HOSPITAL | Age: 78
End: 2018-08-07

## 2018-08-07 ENCOUNTER — DOCUMENTATION (OUTPATIENT)
Dept: CARDIAC REHAB | Facility: HOSPITAL | Age: 78
End: 2018-08-07

## 2018-08-07 NOTE — OUTREACH NOTE
Prep Survey      Responses   Facility patient discharged from?  [place]   Is patient eligible?  Yes   Discharge diagnosis  NSTEMI, s/p heart cath   Does the patient have one of the following disease processes/diagnoses(primary or secondary)?  Acute MI (STEMI,NSTEMI)   Does the patient have Home health ordered?  No   Is there a DME ordered?  No   Prep survey completed?  Yes          Clarita Burgess RN

## 2018-08-08 ENCOUNTER — READMISSION MANAGEMENT (OUTPATIENT)
Dept: CALL CENTER | Facility: HOSPITAL | Age: 78
End: 2018-08-08

## 2018-08-08 NOTE — OUTREACH NOTE
AMI Week 1 Survey      Responses   Facility patient discharged from?  [facility]   Does the patient have one of the following disease processes/diagnoses(primary or secondary)?  Acute MI (STEMI,NSTEMI)   Is there a successful TCM telephone encounter documented?  No   Week 1 attempt successful?  No   Unsuccessful attempts  Attempt 1          Cam Forbes RN

## 2018-08-10 ENCOUNTER — READMISSION MANAGEMENT (OUTPATIENT)
Dept: CALL CENTER | Facility: HOSPITAL | Age: 78
End: 2018-08-10

## 2018-08-10 NOTE — OUTREACH NOTE
AMI Week 1 Survey      Responses   Facility patient discharged from?  [facility]   Does the patient have one of the following disease processes/diagnoses(primary or secondary)?  Acute MI (STEMI,NSTEMI)   Is there a successful TCM telephone encounter documented?  No   Week 1 attempt successful?  No   Unsuccessful attempts  Attempt 2          Cam Forbes RN

## 2018-08-11 ENCOUNTER — READMISSION MANAGEMENT (OUTPATIENT)
Dept: CALL CENTER | Facility: HOSPITAL | Age: 78
End: 2018-08-11

## 2018-08-11 NOTE — OUTREACH NOTE
AMI Week 1 Survey      Responses   Facility patient discharged from?  [place]   Does the patient have one of the following disease processes/diagnoses(primary or secondary)?  Acute MI (STEMI,NSTEMI)   Is there a successful TCM telephone encounter documented?  No   Week 1 attempt successful?  Yes   Call start time  0920   Call end time  0937   Discharge diagnosis  NSTEMI, s/p heart cath   Meds reviewed with patient/caregiver?  Yes   Is the patient having any side effects they believe may be caused by any medication additions or changes?  No   Does the patient have all prescriptions related to this admission filled (includes statins,anticoagulants,HTN meds,anti-arrhythmia meds)  Yes   Is the patient taking all medications as directed (includes completed medication regime)?  Yes   Does the patient have a primary care provider?   Yes   Does the patient have an appointment with their PCP,cardiologist,or clinic within 7 days of discharge?  N/A   Comments regarding PCP  PCP appt on 8/13/18   Has the patient kept scheduled appointments due by today?  N/A   Has home health visited the patient within 72 hours of discharge?  N/A   Psychosocial issues?  No   Did the patient receive a copy of their discharge instructions?  Yes   Nursing interventions  Reviewed instructions with patient   What is the patient's perception of their health status since discharge?  Improving   Nursing interventions  Nurse provided patient education   Is the patient/caregiver able to teach back signs and symptoms of when to call for help immediately:  Sudden chest discomfort, Sudden discomfort in arms, back, neck or jaw, Shortness of breath at any time, Sudden sweating or clammy skin, Nausea or vomiting, Dizziness or lightheadedness   Nursing interventions  Nurse provided patient education   Is the pateint /caregiver able to teach back the importance of cardiac rehab?  No   Nursing interventions  Provided education on importance of cardiac rehab    Is the patient/caregiver able to teach back lifestyle changes to help prevent MIs  Regular exercise as approved by provider, Heart healthy diet, Reducing stress   Is the patient/caregiver able to teach back ways to prevent a second heart attack:  Take medications, Follow up with MD   Is the patient/caregiver able to teach back the hierarchy of who to call/visit for symptoms/problems? PCP, Specialist, Home health nurse, Urgent Care, ED, 911  Yes   Week 1 call completed  Yes   Wrap up additional comments  Pt reports his blood pressure is running 155/87. Pt with history of CVA. Advised pt to take and record blood pressure, and discuss readings at PCP appt on Monday.           Cam Forbes RN

## 2018-08-20 ENCOUNTER — READMISSION MANAGEMENT (OUTPATIENT)
Dept: CALL CENTER | Facility: HOSPITAL | Age: 78
End: 2018-08-20

## 2018-08-20 NOTE — OUTREACH NOTE
AMI Week 2 Survey      Responses   Facility patient discharged from?  [place]   Does the patient have one of the following disease processes/diagnoses(primary or secondary)?  Acute MI (STEMI,NSTEMI)   Week 2 attempt successful?  Yes   Call start time  1220   Call end time  1225   Discharge diagnosis  NSTEMI, s/p heart cath   Is patient permission given to speak with other caregiver?  Yes   Person spoke with today (if not patient) and relationship  Spouse   Meds reviewed with patient/caregiver?  Yes   Is the patient having any side effects they believe may be caused by any medication additions or changes?  No   Does the patient have all prescriptions related to this admission filled (includes statins,anticoagulants,HTN meds,anti-arrhythmia meds)  Yes   Is the patient taking all medications as directed (includes completed medication regime)?  Yes   Does the patient have a primary care provider?   Yes   Does the patient have an appointment with their PCP,cardiologist,or clinic within 7 days of discharge?  N/A   Has the patient kept scheduled appointments due by today?  Yes   Has home health visited the patient within 72 hours of discharge?  N/A   Psychosocial issues?  No   Comments  He has gone back to work However, wife states that he would still want to talk to us.   Did the patient receive a copy of their discharge instructions?  Yes   Nursing interventions  Reviewed instructions with patient   What is the patient's perception of their health status since discharge?  Improving   Nursing interventions  Nurse provided patient education   Is the patient/caregiver able to teach back signs and symptoms of when to call for help immediately:  Sudden chest discomfort, Sudden discomfort in arms, back, neck or jaw, Shortness of breath at any time, Sudden sweating or clammy skin, Nausea or vomiting, Dizziness or lightheadedness   Nursing interventions  Nurse provided patient education   Is the pateint /caregiver able to  teach back the importance of cardiac rehab?  Yes   Nursing interventions  Provided education on importance of cardiac rehab   Is the patient/caregiver able to teach back lifestyle changes to help prevent MIs  Regular exercise as approved by provider, Heart healthy diet, Reducing stress   Is the patient/caregiver able to teach back ways to prevent a second heart attack:  Take medications, Follow up with MD   Is the patient/caregiver able to teach back the hierarchy of who to call/visit for symptoms/problems? PCP, Specialist, Home health nurse, Urgent Care, ED, 911  Yes   Week 2 call completed?  Yes          Orlando Loera RN

## 2018-08-27 ENCOUNTER — READMISSION MANAGEMENT (OUTPATIENT)
Dept: CALL CENTER | Facility: HOSPITAL | Age: 78
End: 2018-08-27

## 2018-08-27 NOTE — OUTREACH NOTE
AMI Week 3 Survey      Responses   Facility patient discharged from?  [place]   Does the patient have one of the following disease processes/diagnoses(primary or secondary)?  Acute MI (STEMI,NSTEMI)   Week 3 attempt successful?  No   Unsuccessful attempts  Attempt 1          Cuong Pineda RN

## 2018-08-30 ENCOUNTER — READMISSION MANAGEMENT (OUTPATIENT)
Dept: CALL CENTER | Facility: HOSPITAL | Age: 78
End: 2018-08-30

## 2018-08-30 NOTE — OUTREACH NOTE
AMI Week 3 Survey      Responses   Facility patient discharged from?  [place]   Does the patient have one of the following disease processes/diagnoses(primary or secondary)?  Acute MI (STEMI,NSTEMI)   Week 3 attempt successful?  No   Unsuccessful attempts  Attempt 2          Pauly Badillo RN

## 2018-09-06 NOTE — PROGRESS NOTES
[facility] AT [address], [address]  [place], [place], [address] [phone]  [email or web address]           OUTPATIENT CLINIC FOLLOW UP NOTE    Patient Care Team:  Patient Care Team:  Graciela Dodson MD as PCP - General  Graciela Dodson MD as PCP - Family Medicine    Subjective:      Chief Complaint   Patient presents with   • Atrial Fibrillation   • Hypertension       HPI:    Palomo Rainey is a 78 y.o. male.  History of Present Illness    The patient has a history of CAD and NSTEMI 8/2018 S/P TYSON to circumflex, paroxysmal atrial fibrillation, hypertension, stroke.  The patient presents today for follow-up.    Since his coronary intervention the patient has done well.  Denies recurrent chest pain.  Denies recurrent palpitations to suggest recurrent atrial fibrillation.  Wore his event monitor for a month.    Has been tolerating his medicines without difficulty.  Some ecchymosis while on Plavix.      Review of Systems:  Negative for exertional chest pain, dyspnea with exertion, orthopnea, PND, lower extremity edema, palpitations, lightheadedness, syncope.    PFSH:  Patient Active Problem List   Diagnosis   • Coronary artery disease involving native coronary artery of native heart without angina pectoris   • Essential hypertension   • History of CVA (cerebrovascular accident)   • Atrial fibrillation, transient (CMS/HCC)         Current Outpatient Prescriptions:   •  aspirin 81 MG EC tablet, Take 81 mg by mouth Daily., Disp: , Rfl:   •  atorvastatin (LIPITOR) 40 MG tablet, Take 1 tablet by mouth Every Night., Disp: 30 tablet, Rfl: 11  •  carvedilol (COREG) 6.25 MG tablet, Take 1 tablet by mouth Every 12 (Twelve) Hours., Disp: 60 tablet, Rfl: 11  •  clopidogrel (PLAVIX) 75 MG tablet, Take 1 tablet by mouth Daily., Disp: 30 tablet, Rfl: 11  •  nitroglycerin (NITROSTAT) 0.4 MG SL tablet, Place 1 tablet under the tongue Every 5 (Five) Minutes As Needed for Chest  "Pain. Take no more than 3 doses in 15 minutes., Disp: 25 tablet, Rfl: 3    No Known Allergies     reports that he has quit smoking. He has never used smokeless tobacco.    No family history on file.      Objective:   Blood pressure 128/72, pulse 61, height 188 cm (74\"), weight 91.8 kg (202 lb 6.4 oz).  CONSTITUTIONAL: No acute distress, normal affect  RESPIRATORY: Normal effort. Clear to auscultation bilaterally without wheezing or rales  CARDIOVASCULAR: Carotids with normal upstrokes without bruits.  Regular rate and rhythm with normal S1 and S2. Without murmur, gallop or rub.  PERIPHERAL VASCULAR: Normal radial pulses bilaterally. There is no lower extremity edema bilaterally.    Labs:    BUN   Date Value Ref Range Status   08/05/2018 14 9 - 23 mg/dL Final     Creatinine   Date Value Ref Range Status   08/05/2018 0.97 0.60 - 1.30 mg/dL Final     Potassium   Date Value Ref Range Status   08/05/2018 3.9 3.5 - 5.5 mmol/L Final     ALT (SGPT)   Date Value Ref Range Status   08/04/2018 16 7 - 40 U/L Final     AST (SGOT)   Date Value Ref Range Status   08/04/2018 22 0 - 33 U/L Final       Lab Results   Component Value Date    CHOL 169 08/04/2018     Lab Results   Component Value Date    TRIG 42 08/04/2018     Lab Results   Component Value Date    HDL 37 (L) 08/04/2018     No components found for: LDLCALC  No results found for: VLDL  No results found for: LDLHDL    Diagnostic Data:    Procedures    TTE 8/2018  · Left ventricular systolic function is normal. Estimated EF = 60%.  · All left ventricular wall segments contract normally.  · Left ventricular diastolic function was indeterminate.    [facility] 8/2018  · There was severe 1 vessel coronary artery disease involving a 90% discrete mid circumflex stenosis followed by multiple intermediate lesions extending into the first obtuse marginal branch.  These lesions are status post intervention with a Xience Sydni 2.25 x 38 mm drug-eluting stent with post dilatation of the " proximal segment with a 3.0 mm diameter balloon.  The bifurcation of the first and second obtuse marginal branches were then treated with a kissing balloon inflation.  · LVEF 60% with mild hypokinesis of the basal to mid anterolateral segment  · The patient went into atrial fibrillation during the procedure.  Rate controlled with heart rate ranging from 60 80 bpm    Assessment and Plan:   Palomo was seen today for atrial fibrillation and hypertension.    Diagnoses and all orders for this visit:    Coronary artery disease involving native coronary artery of native heart without angina pectoris  Dyslipidemia  -CCS 0  -Continue current meds  -Repeat Lipid panel, LFTs at next visit    Atrial fibrillation, transient (CMS/HCC)  -Singular episode during heart cath/ventriculogram  -No recurrent events on 30 day monitor    Essential hypertension  -Continue current related medications      - Return in about 6 months (around 3/10/2019).    Meliton Cantu MD, MSc, [facility]  Interventional Cardiology  [facility] at [facility]

## 2018-09-10 ENCOUNTER — OFFICE VISIT (OUTPATIENT)
Dept: CARDIOLOGY | Facility: CLINIC | Age: 78
End: 2018-09-10

## 2018-09-10 VITALS
WEIGHT: 202.4 LBS | HEART RATE: 61 BPM | DIASTOLIC BLOOD PRESSURE: 72 MMHG | HEIGHT: 74 IN | BODY MASS INDEX: 25.98 KG/M2 | SYSTOLIC BLOOD PRESSURE: 128 MMHG

## 2018-09-10 DIAGNOSIS — I48.91 ATRIAL FIBRILLATION, TRANSIENT (HCC): ICD-10-CM

## 2018-09-10 DIAGNOSIS — I25.10 CORONARY ARTERY DISEASE INVOLVING NATIVE CORONARY ARTERY OF NATIVE HEART WITHOUT ANGINA PECTORIS: Primary | ICD-10-CM

## 2018-09-10 DIAGNOSIS — I10 ESSENTIAL HYPERTENSION: ICD-10-CM

## 2018-09-10 PROBLEM — R07.9 CHEST PAIN: Status: RESOLVED | Noted: 2018-08-04 | Resolved: 2018-09-10

## 2018-09-10 PROCEDURE — 99213 OFFICE O/P EST LOW 20 MIN: CPT | Performed by: INTERNAL MEDICINE

## 2019-03-08 NOTE — PROGRESS NOTES
" [place] [facility] [address], [address]  [place], [place], [address] [phone]  WWW[facility]           OUTPATIENT CLINIC FOLLOW UP NOTE    Patient Care Team:  Patient Care Team:  Graciela Dodson MD as PCP - General  Graciela Dodson MD as PCP - Family Medicine    Subjective:      Chief Complaint   Patient presents with   • Coronary Artery Disease       HPI:    Palomo Rainey is a 78 y.o. male.  The patient has a history of CAD and NSTEMI 8/2018 S/P TYSON to circumflex, paroxysmal atrial fibrillation, hypertension, stroke.  The patient presents today for follow-up.    Since his last visit the patient discontinued his Plavix because it made him feel \"unwell\".  He also had myalgias with atorvastatin which she also discontinued.  Discontinuing both medicines overall helped him feel better.    Denies chest pain, dyspnea, lower extremity swelling.    Review of Systems:  Negative for exertional chest pain, dyspnea with exertion, orthopnea, PND, lower extremity edema, palpitations, lightheadedness, syncope.    PFSH:  Patient Active Problem List   Diagnosis   • Coronary artery disease involving native coronary artery of native heart without angina pectoris   • Essential hypertension   • History of CVA (cerebrovascular accident)   • Atrial fibrillation, transient (CMS/HCC)         Current Outpatient Medications:   •  aspirin 81 MG EC tablet, Take 81 mg by mouth Daily., Disp: , Rfl:   •  carvedilol (COREG) 6.25 MG tablet, Take 1 tablet by mouth Every 12 (Twelve) Hours. (Patient taking differently: Take 25 mg by mouth Every 12 (Twelve) Hours.), Disp: 60 tablet, Rfl: 11  •  nitroglycerin (NITROSTAT) 0.4 MG SL tablet, Place 1 tablet under the tongue Every 5 (Five) Minutes As Needed for Chest Pain. Take no more than 3 doses in 15 minutes., Disp: 25 tablet, Rfl: 3  •  rosuvastatin (CRESTOR) 10 MG tablet, Take 1 tablet by mouth Daily., Disp: 30 tablet, Rfl: 11    No Known " "Allergies     reports that he quit smoking about 34 years ago. His smoking use included cigarettes. he has never used smokeless tobacco.    History reviewed. No pertinent family history.      Objective:   Blood pressure 130/70, pulse 67, height 182.9 cm (72\"), weight 95.7 kg (211 lb), SpO2 94 %.  CONSTITUTIONAL: No acute distress, normal affect  RESPIRATORY: Normal effort. Clear to auscultation bilaterally without wheezing or rales  CARDIOVASCULAR: Regular rate and rhythm with normal S1 and S2. Without murmur, gallop or rub.  PERIPHERAL VASCULAR: Normal radial pulses bilaterally. There is no peripheral edema bilaterally.    Labs:    BUN   Date Value Ref Range Status   08/05/2018 14 9 - 23 mg/dL Final     Creatinine   Date Value Ref Range Status   08/05/2018 0.97 0.60 - 1.30 mg/dL Final     Potassium   Date Value Ref Range Status   08/05/2018 3.9 3.5 - 5.5 mmol/L Final     ALT (SGPT)   Date Value Ref Range Status   08/04/2018 16 7 - 40 U/L Final     AST (SGOT)   Date Value Ref Range Status   08/04/2018 22 0 - 33 U/L Final       Lab Results   Component Value Date    CHOL 169 08/04/2018     Lab Results   Component Value Date    TRIG 42 08/04/2018     Lab Results   Component Value Date    HDL 37 (L) 08/04/2018     Lab Results   Component Value Date     (H) 08/04/2018     No results found for: VLDL  No results found for: LDLHDL      Diagnostic Data:    Procedures    30-day event monitor 8/2018 - no recurrent atrial fibrillation    TTE 8/2018  · Left ventricular systolic function is normal. Estimated EF = 60%.  · All left ventricular wall segments contract normally.  · Left ventricular diastolic function was indeterminate.    [facility] 8/2018  · There was severe 1 vessel coronary artery disease involving a 90% discrete mid circumflex stenosis followed by multiple intermediate lesions extending into the first obtuse marginal branch.  These lesions are status post intervention with a Xience Sydni 2.25 x 38 mm drug-eluting " stent with post dilatation of the proximal segment with a 3.0 mm diameter balloon.  The bifurcation of the first and second obtuse marginal branches were then treated with a kissing balloon inflation.  · LVEF 60% with mild hypokinesis of the basal to mid anterolateral segment  · The patient went into atrial fibrillation during the procedure.  Rate controlled with heart rate ranging from 60 80 bpm    Assessment and Plan:   Palomo was seen today for atrial fibrillation and hypertension.    Diagnoses and all orders for this visit:    Coronary artery disease involving native coronary artery of native heart without angina pectoris  Dyslipidemia  -CCS 0  -Continue aspirin.  Discussed the risks and benefits of being off of Plavix.  Patient wishes to remain off of it despite only being 6 months post ACS and PCI.  We will continue aspirin  -We will switch Lipitor to Crestor.  If the patient has myalgias with Crestor as well, will discuss risk and benefits of alternative options.  LDL at the time of his MI was 139  -Repeat lipid panel LFTs at next visit  -Continue carvedilol    Atrial fibrillation, transient (CMS/HCC)  -Had a transient event during left ventriculography and a negative event monitor thereafter.    Essential hypertension  -Continue current related medications    - Return in about 6 months (around 9/11/2019).    Meliton Cantu MD, MSc, [facility]  Interventional Cardiology  [place] [facility] at [facility]

## 2019-03-11 ENCOUNTER — OFFICE VISIT (OUTPATIENT)
Dept: CARDIOLOGY | Facility: CLINIC | Age: 79
End: 2019-03-11

## 2019-03-11 VITALS
SYSTOLIC BLOOD PRESSURE: 130 MMHG | OXYGEN SATURATION: 94 % | HEIGHT: 72 IN | DIASTOLIC BLOOD PRESSURE: 70 MMHG | BODY MASS INDEX: 28.58 KG/M2 | WEIGHT: 211 LBS | HEART RATE: 67 BPM

## 2019-03-11 DIAGNOSIS — I25.10 CORONARY ARTERY DISEASE INVOLVING NATIVE CORONARY ARTERY OF NATIVE HEART WITHOUT ANGINA PECTORIS: Primary | ICD-10-CM

## 2019-03-11 DIAGNOSIS — Z86.73 HISTORY OF CVA (CEREBROVASCULAR ACCIDENT): ICD-10-CM

## 2019-03-11 DIAGNOSIS — I10 ESSENTIAL HYPERTENSION: ICD-10-CM

## 2019-03-11 DIAGNOSIS — I48.91 ATRIAL FIBRILLATION, TRANSIENT (HCC): ICD-10-CM

## 2019-03-11 PROCEDURE — 99214 OFFICE O/P EST MOD 30 MIN: CPT | Performed by: INTERNAL MEDICINE

## 2019-03-11 RX ORDER — ROSUVASTATIN CALCIUM 10 MG/1
10 TABLET, COATED ORAL DAILY
Qty: 30 TABLET | Refills: 11 | Status: SHIPPED | OUTPATIENT
Start: 2019-03-11 | End: 2019-09-23

## 2019-04-05 ENCOUNTER — TELEPHONE (OUTPATIENT)
Dept: CARDIOLOGY | Facility: CLINIC | Age: 79
End: 2019-04-05

## 2019-04-05 NOTE — TELEPHONE ENCOUNTER
Pt called with concerns of some swelling in his left foot x3 weeks. He states it has been staying about the same for the duration of the 3 weeks. He denies any pain, no red streaks, capillary refill is WNL. He states he sits at a desk all day for work. I advised while he is working to elevate his foot. BP this morning was 143/87 HR 63. Pt denies chest pain, or palpitations. He states his SOB hasn't changed, it isn't any worse then it has been in the past. Advised to contact PCP and keep F/U appointment. Advised if anything gets worse or he notices new onset symptoms of pain, red streaks, weak pulses, loss of color in toes to go to the ER. Pt agreeable to plan and verbalized understanding.

## 2019-08-20 ENCOUNTER — TELEPHONE (OUTPATIENT)
Dept: CARDIOLOGY | Facility: CLINIC | Age: 79
End: 2019-08-20

## 2019-08-20 NOTE — TELEPHONE ENCOUNTER
Patient returned call to report that his BP is averaging 161/96 HR 62. Patient reports that his PCP started him on Norvasc 5 mg two days ago. Patient advised to keep daily BP/ HR log and call me on Monday if remains elevated above 140/90. Pt agreeable to plan, all questions answered at this time.

## 2019-08-20 NOTE — TELEPHONE ENCOUNTER
Patient called to report that his BP is remaining elevated despite his PCP adding on a second BP medication. No answer on return call, LVM requesting a call back.

## 2019-09-23 ENCOUNTER — OFFICE VISIT (OUTPATIENT)
Dept: CARDIOLOGY | Facility: CLINIC | Age: 79
End: 2019-09-23

## 2019-09-23 VITALS
WEIGHT: 209 LBS | DIASTOLIC BLOOD PRESSURE: 68 MMHG | BODY MASS INDEX: 28.31 KG/M2 | HEART RATE: 66 BPM | SYSTOLIC BLOOD PRESSURE: 104 MMHG | HEIGHT: 72 IN

## 2019-09-23 DIAGNOSIS — I25.10 CORONARY ARTERY DISEASE INVOLVING NATIVE CORONARY ARTERY OF NATIVE HEART WITHOUT ANGINA PECTORIS: Primary | ICD-10-CM

## 2019-09-23 DIAGNOSIS — I10 ESSENTIAL HYPERTENSION: ICD-10-CM

## 2019-09-23 DIAGNOSIS — I48.91 ATRIAL FIBRILLATION, TRANSIENT (HCC): ICD-10-CM

## 2019-09-23 DIAGNOSIS — Z86.73 HISTORY OF CVA (CEREBROVASCULAR ACCIDENT): ICD-10-CM

## 2019-09-23 PROCEDURE — 99213 OFFICE O/P EST LOW 20 MIN: CPT | Performed by: INTERNAL MEDICINE

## 2019-09-23 RX ORDER — HYDROCHLOROTHIAZIDE 12.5 MG/1
12.5 CAPSULE, GELATIN COATED ORAL DAILY
COMMUNITY
End: 2021-05-03

## 2019-09-23 RX ORDER — CARVEDILOL 12.5 MG/1
12.5 TABLET ORAL 2 TIMES DAILY WITH MEALS
COMMUNITY
End: 2021-05-03

## 2019-09-23 NOTE — PROGRESS NOTES
[place] [facility] AT [address], [address]  [place], [place], [address] [phone]  [facility]           OUTPATIENT CLINIC FOLLOW UP NOTE    Patient Care Team:  Patient Care Team:  Graciela Dodson MD as PCP - General  Graciela Dodson MD as PCP - Family Medicine  Graciela Dodson MD as PCP - Claims Attributed    Subjective:      Chief Complaint   Patient presents with   • Coronary Artery Disease       HPI:    Palomo Rainey is a 79 y.o. male.  Problem List:  1. CAD and NSTEMI 8/2018 S/P TYSON to circumflex  2. Paroxysmal atrial fibrillation, single episode during left ventriculography  3. Hypertension  4. History of stroke.      The patient presents today for follow-up.    Since his last visit the patient has been doing well from a cardiac standpoint.  Denies chest pain, dyspnea, palpitations.  Had myalgias with Crestor; symptoms improved after discontinuation of the medication    Review of Systems:  Negative for exertional chest pain, dyspnea with exertion, orthopnea, PND, lower extremity edema, palpitations, lightheadedness, syncope.    PFSH:  Patient Active Problem List   Diagnosis   • Coronary artery disease involving native coronary artery of native heart without angina pectoris   • Essential hypertension   • History of CVA (cerebrovascular accident)   • Atrial fibrillation, transient (CMS/HCC)         Current Outpatient Medications:   •  aspirin 81 MG EC tablet, Take 81 mg by mouth Daily., Disp: , Rfl:   •  carvedilol (COREG) 12.5 MG tablet, Take 12.5 mg by mouth 2 (Two) Times a Day With Meals., Disp: , Rfl:   •  hydrochlorothiazide (MICROZIDE) 12.5 MG capsule, Take 12.5 mg by mouth Daily., Disp: , Rfl:   •  nitroglycerin (NITROSTAT) 0.4 MG SL tablet, Place 1 tablet under the tongue Every 5 (Five) Minutes As Needed for Chest Pain. Take no more than 3 doses in 15 minutes., Disp: 25 tablet, Rfl: 3    No Known Allergies     reports that he quit smoking  "about 34 years ago. His smoking use included cigarettes. He has never used smokeless tobacco.    History reviewed. No pertinent family history.      Objective:   Blood pressure 104/68, pulse 66, height 182.9 cm (72\"), weight 94.8 kg (209 lb).  CONSTITUTIONAL: No acute distress, normal affect  RESPIRATORY: Normal effort. Clear to auscultation bilaterally without wheezing or rales  CARDIOVASCULAR: Regular rate and rhythm with normal S1 and S2. Without murmur, gallop or rub.  PERIPHERAL VASCULAR: Normal radial pulses bilaterally. There is no peripheral edema bilaterally.    Labs:    BUN   Date Value Ref Range Status   08/05/2018 14 9 - 23 mg/dL Final     Creatinine   Date Value Ref Range Status   08/05/2018 0.97 0.60 - 1.30 mg/dL Final     Potassium   Date Value Ref Range Status   08/05/2018 3.9 3.5 - 5.5 mmol/L Final     ALT (SGPT)   Date Value Ref Range Status   08/04/2018 16 7 - 40 U/L Final     AST (SGOT)   Date Value Ref Range Status   08/04/2018 22 0 - 33 U/L Final       Lab Results   Component Value Date    CHOL 169 08/04/2018     Lab Results   Component Value Date    TRIG 42 08/04/2018     Lab Results   Component Value Date    HDL 37 (L) 08/04/2018     Lab Results   Component Value Date     (H) 08/04/2018     No results found for: VLDL  No results found for: LDLHDL      Diagnostic Data:    Procedures    30-day event monitor 8/2018 - no recurrent atrial fibrillation    TTE 8/2018  · Left ventricular systolic function is normal. Estimated EF = 60%.  · All left ventricular wall segments contract normally.  · Left ventricular diastolic function was indeterminate.    [facility] 8/2018  · There was severe 1 vessel coronary artery disease involving a 90% discrete mid circumflex stenosis followed by multiple intermediate lesions extending into the first obtuse marginal branch.  These lesions are status post intervention with a Xience Sydni 2.25 x 38 mm drug-eluting stent with post dilatation of the proximal segment " with a 3.0 mm diameter balloon.  The bifurcation of the first and second obtuse marginal branches were then treated with a kissing balloon inflation.  · LVEF 60% with mild hypokinesis of the basal to mid anterolateral segment  · The patient went into atrial fibrillation during the procedure.  Rate controlled with heart rate ranging from 60 80 bpm    Assessment and Plan:   Palomo was seen today for atrial fibrillation and hypertension.    Diagnoses and all orders for this visit:    Coronary artery disease involving native coronary artery of native heart without angina pectoris  Dyslipidemia  -CCS 0  -Continue aspirin.  Off clopidogrel  -Myalgias with atorvastatin, rosuvastatin.  Patient reluctant to try another statin at this time.  Discussed a heart healthy diet and regular forms of cardiac exercise  -Repeat lipid panel LFTs at next visit with PCP.  Asked that results get faxed to us  -Continue carvedilol, amlodipine    Atrial fibrillation, transient (CMS/HCC)  -Had a transient event during left ventriculography and a negative event monitor thereafter.    Essential hypertension  -Continue current related medications    - Return in about 1 year (around 9/23/2020).    Meliton Cantu MD, MSc, [facility]  Interventional Cardiology  [place] [facility] at [facility]

## 2021-05-03 ENCOUNTER — OFFICE VISIT (OUTPATIENT)
Dept: FAMILY MEDICINE CLINIC | Facility: CLINIC | Age: 81
End: 2021-05-03

## 2021-05-03 ENCOUNTER — HOSPITAL ENCOUNTER (OUTPATIENT)
Dept: GENERAL RADIOLOGY | Facility: HOSPITAL | Age: 81
Discharge: HOME | End: 2021-05-03
Admitting: PHYSICIAN ASSISTANT

## 2021-05-03 VITALS
DIASTOLIC BLOOD PRESSURE: 82 MMHG | SYSTOLIC BLOOD PRESSURE: 138 MMHG | HEART RATE: 92 BPM | OXYGEN SATURATION: 98 % | TEMPERATURE: 98.6 F | BODY MASS INDEX: 26.72 KG/M2 | RESPIRATION RATE: 18 BRPM | WEIGHT: 197 LBS

## 2021-05-03 DIAGNOSIS — Z51.81 MEDICATION MONITORING ENCOUNTER: ICD-10-CM

## 2021-05-03 DIAGNOSIS — R19.4 RECENT CHANGE IN FREQUENCY OF BOWEL MOVEMENTS: ICD-10-CM

## 2021-05-03 DIAGNOSIS — E55.9 VITAMIN D DEFICIENCY: ICD-10-CM

## 2021-05-03 DIAGNOSIS — E61.1 LOW IRON: ICD-10-CM

## 2021-05-03 DIAGNOSIS — E78.2 MIXED HYPERLIPIDEMIA: ICD-10-CM

## 2021-05-03 DIAGNOSIS — R39.198 DIFFICULTY URINATING: ICD-10-CM

## 2021-05-03 DIAGNOSIS — R39.11 HESITANCY OF MICTURITION: ICD-10-CM

## 2021-05-03 DIAGNOSIS — I10 ESSENTIAL HYPERTENSION: Primary | ICD-10-CM

## 2021-05-03 DIAGNOSIS — E03.9 ACQUIRED HYPOTHYROIDISM: ICD-10-CM

## 2021-05-03 LAB
BILIRUB BLD-MCNC: NEGATIVE MG/DL
CLARITY, POC: CLEAR
COLOR UR: YELLOW
GLUCOSE UR STRIP-MCNC: NEGATIVE MG/DL
KETONES UR QL: NEGATIVE
LEUKOCYTE EST, POC: NEGATIVE
NITRITE UR-MCNC: NEGATIVE MG/ML
PH UR: 6 [PH] (ref 5–8)
PROT UR STRIP-MCNC: NEGATIVE MG/DL
RBC # UR STRIP: NEGATIVE /UL
SP GR UR: 1.02 (ref 1–1.03)
UROBILINOGEN UR QL: NORMAL

## 2021-05-03 PROCEDURE — 99203 OFFICE O/P NEW LOW 30 MIN: CPT | Performed by: PHYSICIAN ASSISTANT

## 2021-05-03 PROCEDURE — 81003 URINALYSIS AUTO W/O SCOPE: CPT | Performed by: PHYSICIAN ASSISTANT

## 2021-05-03 PROCEDURE — 93005 ELECTROCARDIOGRAM TRACING: CPT | Performed by: PHYSICIAN ASSISTANT

## 2021-05-03 PROCEDURE — 74018 RADEX ABDOMEN 1 VIEW: CPT

## 2021-05-03 RX ORDER — LISINOPRIL 20 MG/1
20 TABLET ORAL DAILY
COMMUNITY
End: 2021-07-08

## 2021-05-03 NOTE — PROGRESS NOTES
"Subjective   Palomo Rainey is a 80 y.o. male.     History of Present Illness   Pt presents to establish care     Per Cardiology note from Dr. Cantu in 2019 patient's cardiac hx is as follows:   1. CAD and NSTEMI 8/2018 S/P TYSON to circumflex  2. Paroxysmal atrial fibrillation, single episode during left ventriculography  3. Hypertension  4. History of stroke.      F/u for HTN     At that time, patient was taking carvedilol and amlodipine. Now only taking lisinopril. Was getting from  internal medicine. Unsure why he stopped the others. Also taking baby ASA    Going 5 to 6 times per day with BM X few weeks. Having since of urgency. Stools are smaller \"size of peanuts\"  Previously was going once per day and stools were normal    No blood in stools   No hx of IBS   Not having abdominal pain   3 colonoscopy in the past. 2 normal and one with 2 polyps. Most recent was normal     Been more fatigued     Some difficulty with urinating   Hx of elevated PSA   Has had negative prostate biopsy over 15 years ago   4-5 weeks ago, more frequency, urgency, and hesitancy     Hx of underactive thyroid. Was on medication when younger and again several years ago. Stopped taking for 6 months and told his thyroid function was normal, so does not take anything now.   Been awhile since any recent blood work     Notes some decrease in appetite  Lives with wife who needs a caregiver       The following portions of the patient's history were reviewed and updated as appropriate: allergies, current medications, past family history, past medical history, past social history, past surgical history and problem list.    Review of Systems   Constitutional: Positive for appetite change. Negative for chills, diaphoresis and fever.   HENT: Negative.  Negative for congestion, ear discharge, ear pain, hearing loss, nosebleeds, postnasal drip, sinus pressure, sneezing and sore throat.    Eyes: Negative.    Respiratory: Negative.  Negative for cough, " chest tightness, shortness of breath and wheezing.    Cardiovascular: Negative.  Negative for chest pain, palpitations and leg swelling.   Gastrointestinal: Positive for constipation. Negative for abdominal distention, abdominal pain, blood in stool, diarrhea, nausea and vomiting.   Genitourinary: Positive for decreased urine volume, difficulty urinating, frequency and urgency. Negative for dysuria, flank pain and hematuria.   Musculoskeletal: Negative.  Negative for arthralgias, back pain, gait problem, joint swelling, myalgias, neck pain and neck stiffness.   Skin: Negative.  Negative for color change, pallor, rash and wound.   Neurological: Negative for dizziness, syncope, weakness, light-headedness, numbness and headaches.       Objective    Blood pressure 138/82, pulse 92, temperature 98.6 °F (37 °C), resp. rate 18, weight 89.4 kg (197 lb), SpO2 98 %.     Physical Exam  Vitals and nursing note reviewed.   Constitutional:       Appearance: Normal appearance. He is well-developed.   HENT:      Head: Normocephalic and atraumatic.      Right Ear: Tympanic membrane, ear canal and external ear normal.      Left Ear: Tympanic membrane, ear canal and external ear normal.      Nose: Nose normal.   Eyes:      Conjunctiva/sclera: Conjunctivae normal.   Neck:      Thyroid: No thyromegaly.      Trachea: No tracheal deviation.   Cardiovascular:      Rate and Rhythm: Normal rate and regular rhythm.      Heart sounds: Normal heart sounds.   Pulmonary:      Effort: Pulmonary effort is normal. No respiratory distress.      Breath sounds: Normal breath sounds. No wheezing or rales.   Chest:      Chest wall: No tenderness.   Abdominal:      General: Bowel sounds are normal. There is no distension.      Palpations: Abdomen is soft. There is no mass.      Tenderness: There is no abdominal tenderness. There is no guarding or rebound.      Hernia: No hernia is present.   Musculoskeletal:      Cervical back: Normal range of motion and  neck supple.   Lymphadenopathy:      Cervical: No cervical adenopathy.   Skin:     General: Skin is warm and dry.   Neurological:      Mental Status: He is alert and oriented to person, place, and time.   Psychiatric:         Behavior: Behavior normal.         Thought Content: Thought content normal.         Judgment: Judgment normal.       ECG 12 Lead    Date/Time: 5/3/2021 11:15 AM  Performed by: Tory Garcia PA  Authorized by: Tory Garcia PA   Comparison: not compared with previous ECG   Previous ECG: no previous ECG available  Rhythm: sinus rhythm  Rate: normal  Conduction: conduction normal  ST Segments: ST segments normal  T Waves: T waves normal  QRS axis: normal    Clinical impression: normal ECG              Assessment/Plan   Diagnoses and all orders for this visit:    1. Essential hypertension (Primary)  -     CBC w AUTO Differential  -     Comprehensive metabolic panel  -     ECG 12 Lead    2. Difficulty urinating  -     POCT urinalysis dipstick, automated  -     CBC w AUTO Differential  -     Comprehensive metabolic panel  -     PSA DIAGNOSTIC ONLY    3. Mixed hyperlipidemia  -     Lipid panel    4. Medication monitoring encounter  -     Magnesium  -     Vitamin B12    5. Low iron  -     Iron Profile    6. Vitamin D deficiency  -     Vitamin D 25 hydroxy    7. Hesitancy of micturition   -     PSA DIAGNOSTIC ONLY    8. Acquired hypothyroidism  -     TSH  -     T4, free    9. Recent change in frequency of bowel movements  -     XR Abdomen 1 View      Check labs as outlined in plan   EKG normal   BP noted to be slightly elevated in office along with pulse rate. Pt states he is content on current BP medication lisinopril so will remain on this for now. Monitor BP at home and call if staying elevate.  Check XR abdomen for change with bowel movements   UA normal.   F/u pending labs and imaging

## 2021-05-04 LAB
25(OH)D3+25(OH)D2 SERPL-MCNC: 18.7 NG/ML (ref 30–100)
ALBUMIN SERPL-MCNC: 4 G/DL (ref 3.6–4.6)
ALBUMIN/GLOB SERPL: 1.3 {RATIO} (ref 1.2–2.2)
ALP SERPL-CCNC: 74 IU/L (ref 39–117)
ALT SERPL-CCNC: 13 IU/L (ref 0–44)
AST SERPL-CCNC: 22 IU/L (ref 0–40)
BASOPHILS # BLD AUTO: 0 X10E3/UL (ref 0–0.2)
BASOPHILS NFR BLD AUTO: 1 %
BILIRUB SERPL-MCNC: 0.3 MG/DL (ref 0–1.2)
BUN SERPL-MCNC: 15 MG/DL (ref 8–27)
BUN/CREAT SERPL: 14 (ref 10–24)
CALCIUM SERPL-MCNC: 8.8 MG/DL (ref 8.6–10.2)
CHLORIDE SERPL-SCNC: 105 MMOL/L (ref 96–106)
CHOLEST SERPL-MCNC: 168 MG/DL (ref 100–199)
CO2 SERPL-SCNC: 25 MMOL/L (ref 20–29)
CREAT SERPL-MCNC: 1.11 MG/DL (ref 0.76–1.27)
EOSINOPHIL # BLD AUTO: 0.2 X10E3/UL (ref 0–0.4)
EOSINOPHIL NFR BLD AUTO: 3 %
ERYTHROCYTE [DISTWIDTH] IN BLOOD BY AUTOMATED COUNT: 12.8 % (ref 11.6–15.4)
GLOBULIN SER CALC-MCNC: 3 G/DL (ref 1.5–4.5)
GLUCOSE SERPL-MCNC: 76 MG/DL (ref 65–99)
HCT VFR BLD AUTO: 43.3 % (ref 37.5–51)
HDLC SERPL-MCNC: 36 MG/DL
HGB BLD-MCNC: 14.3 G/DL (ref 13–17.7)
IMM GRANULOCYTES # BLD AUTO: 0 X10E3/UL (ref 0–0.1)
IMM GRANULOCYTES NFR BLD AUTO: 0 %
IRON SATN MFR SERPL: 31 % (ref 15–55)
IRON SERPL-MCNC: 76 UG/DL (ref 38–169)
LDLC SERPL CALC-MCNC: 117 MG/DL (ref 0–99)
LYMPHOCYTES # BLD AUTO: 1.2 X10E3/UL (ref 0.7–3.1)
LYMPHOCYTES NFR BLD AUTO: 21 %
MAGNESIUM SERPL-MCNC: 2.1 MG/DL (ref 1.6–2.3)
MCH RBC QN AUTO: 28.7 PG (ref 26.6–33)
MCHC RBC AUTO-ENTMCNC: 33 G/DL (ref 31.5–35.7)
MCV RBC AUTO: 87 FL (ref 79–97)
MONOCYTES # BLD AUTO: 0.5 X10E3/UL (ref 0.1–0.9)
MONOCYTES NFR BLD AUTO: 9 %
NEUTROPHILS # BLD AUTO: 4 X10E3/UL (ref 1.4–7)
NEUTROPHILS NFR BLD AUTO: 66 %
PLATELET # BLD AUTO: 263 X10E3/UL (ref 150–450)
POTASSIUM SERPL-SCNC: 4.6 MMOL/L (ref 3.5–5.2)
PROT SERPL-MCNC: 7 G/DL (ref 6–8.5)
PSA SERPL-MCNC: 4.9 NG/ML (ref 0–4)
RBC # BLD AUTO: 4.99 X10E6/UL (ref 4.14–5.8)
SODIUM SERPL-SCNC: 142 MMOL/L (ref 134–144)
T4 FREE SERPL-MCNC: 0.86 NG/DL (ref 0.82–1.77)
TIBC SERPL-MCNC: 249 UG/DL (ref 250–450)
TRIGL SERPL-MCNC: 81 MG/DL (ref 0–149)
TSH SERPL DL<=0.005 MIU/L-ACNC: 4.93 UIU/ML (ref 0.45–4.5)
UIBC SERPL-MCNC: 173 UG/DL (ref 111–343)
VIT B12 SERPL-MCNC: 215 PG/ML (ref 232–1245)
VLDLC SERPL CALC-MCNC: 15 MG/DL (ref 5–40)
WBC # BLD AUTO: 5.9 X10E3/UL (ref 3.4–10.8)

## 2021-05-12 DIAGNOSIS — E03.9 ACQUIRED HYPOTHYROIDISM: ICD-10-CM

## 2021-05-12 DIAGNOSIS — R97.20 ELEVATED PSA: Primary | ICD-10-CM

## 2021-05-12 RX ORDER — LEVOTHYROXINE SODIUM 0.03 MG/1
25 TABLET ORAL DAILY
Qty: 30 TABLET | Refills: 1 | Status: SHIPPED | OUTPATIENT
Start: 2021-05-12 | End: 2021-06-22

## 2021-05-13 ENCOUNTER — CLINICAL SUPPORT (OUTPATIENT)
Dept: FAMILY MEDICINE CLINIC | Facility: CLINIC | Age: 81
End: 2021-05-13

## 2021-05-13 DIAGNOSIS — E53.8 VITAMIN B12 DEFICIENCY: Primary | ICD-10-CM

## 2021-05-13 DIAGNOSIS — E53.8 B12 DEFICIENCY: ICD-10-CM

## 2021-05-13 PROCEDURE — 96372 THER/PROPH/DIAG INJ SC/IM: CPT | Performed by: PHYSICIAN ASSISTANT

## 2021-05-13 RX ORDER — CYANOCOBALAMIN 1000 UG/ML
1000 INJECTION, SOLUTION INTRAMUSCULAR; SUBCUTANEOUS
Status: SHIPPED | OUTPATIENT
Start: 2021-05-13

## 2021-05-13 RX ADMIN — CYANOCOBALAMIN 1000 MCG: 1000 INJECTION, SOLUTION INTRAMUSCULAR; SUBCUTANEOUS at 10:53

## 2021-06-09 ENCOUNTER — CLINICAL SUPPORT (OUTPATIENT)
Dept: FAMILY MEDICINE CLINIC | Facility: CLINIC | Age: 81
End: 2021-06-09

## 2021-06-09 DIAGNOSIS — E53.8 B12 DEFICIENCY: ICD-10-CM

## 2021-06-09 PROCEDURE — 96372 THER/PROPH/DIAG INJ SC/IM: CPT | Performed by: PHYSICIAN ASSISTANT

## 2021-06-09 RX ADMIN — CYANOCOBALAMIN 1000 MCG: 1000 INJECTION, SOLUTION INTRAMUSCULAR; SUBCUTANEOUS at 14:28

## 2021-06-21 ENCOUNTER — OFFICE VISIT (OUTPATIENT)
Dept: FAMILY MEDICINE CLINIC | Facility: CLINIC | Age: 81
End: 2021-06-21

## 2021-06-21 VITALS
RESPIRATION RATE: 20 BRPM | HEART RATE: 96 BPM | TEMPERATURE: 98.3 F | SYSTOLIC BLOOD PRESSURE: 140 MMHG | BODY MASS INDEX: 25.91 KG/M2 | WEIGHT: 195.5 LBS | DIASTOLIC BLOOD PRESSURE: 70 MMHG | HEIGHT: 73 IN

## 2021-06-21 DIAGNOSIS — E53.8 VITAMIN B12 DEFICIENCY: ICD-10-CM

## 2021-06-21 DIAGNOSIS — E03.9 ACQUIRED HYPOTHYROIDISM: Primary | ICD-10-CM

## 2021-06-21 PROCEDURE — 99213 OFFICE O/P EST LOW 20 MIN: CPT | Performed by: PHYSICIAN ASSISTANT

## 2021-06-21 NOTE — PROGRESS NOTES
Subjective   Palomo Rainey is a 81 y.o. male.     History of Present Illness   F/u for hypothyroidism. Has been diagnosed with this in the past, but recently started back on medication a month ago.   Pt notes he has been taking regularly   Has vitamin b12 deficiency. Started on b12 injections. Has had X 2. No real improvement with energy   Checking Blood pressure at home once or twice a week. Getting within normal range for systolic at home, although noted to be elevated in office.    Waking up with sharp left upper abdominal pain most every morning. Lasting a few minutes. Sleeps on this side due to R hip pain. When he gets up and walks it goes away. Nothing else makes pain better or worse. Not happening during the day. Denies heartburn     Still with loose piece meal BM. Had constipation noted last month on XR of abdomen. Took miralax for a week, did have some bowel movement. UTD on colon cancer screening. Hx of polyps   Followed with urology for elevated PSA. Will check again in 6 months. Did have nocturia but states this resolved when he stopped drinking Prop[employer]. Denies any urinary symptoms at this time     The following portions of the patient's history were reviewed and updated as appropriate: allergies, current medications, past family history, past medical history, past social history, past surgical history and problem list.    Review of Systems   Constitutional: Positive for fatigue. Negative for chills, diaphoresis and fever.   HENT: Negative.  Negative for congestion, ear discharge, ear pain, hearing loss, nosebleeds, postnasal drip, sinus pressure, sneezing and sore throat.    Eyes: Negative.    Respiratory: Negative.  Negative for cough, chest tightness, shortness of breath and wheezing.    Cardiovascular: Negative.  Negative for chest pain, palpitations and leg swelling.   Gastrointestinal: Positive for abdominal pain, constipation and diarrhea. Negative for abdominal distention, blood in stool,  "nausea and vomiting.   Genitourinary: Negative.  Negative for difficulty urinating, dysuria, flank pain, frequency, hematuria and urgency.   Skin: Negative.  Negative for color change, pallor, rash and wound.   Neurological: Negative for dizziness, syncope, weakness, light-headedness, numbness and headaches.       Objective    Blood pressure 140/70, pulse 96, temperature 98.3 °F (36.8 °C), resp. rate 20, height 185.4 cm (73\"), weight 88.7 kg (195 lb 8 oz).     Physical Exam  Vitals and nursing note reviewed.   Constitutional:       Appearance: He is well-developed.   HENT:      Head: Normocephalic and atraumatic.      Right Ear: External ear normal.      Left Ear: External ear normal.      Nose: Nose normal.   Eyes:      Conjunctiva/sclera: Conjunctivae normal.   Neck:      Thyroid: No thyromegaly.      Trachea: No tracheal deviation.   Cardiovascular:      Rate and Rhythm: Normal rate and regular rhythm.      Heart sounds: Normal heart sounds.   Pulmonary:      Effort: Pulmonary effort is normal. No respiratory distress.      Breath sounds: Normal breath sounds. No wheezing or rales.   Chest:      Chest wall: No tenderness.   Abdominal:      General: Bowel sounds are normal. There is no distension.      Palpations: Abdomen is soft. There is no mass.      Tenderness: There is no abdominal tenderness. There is no guarding or rebound.      Hernia: No hernia is present.      Comments: No pain reproduced with palpation to area of concern    Musculoskeletal:      Cervical back: Normal range of motion and neck supple.   Lymphadenopathy:      Cervical: No cervical adenopathy.   Skin:     General: Skin is warm and dry.   Neurological:      Mental Status: He is alert and oriented to person, place, and time.   Psychiatric:         Mood and Affect: Mood normal.         Behavior: Behavior normal.         Thought Content: Thought content normal.         Judgment: Judgment normal.         Assessment/Plan   Diagnoses and all " orders for this visit:    1. Acquired hypothyroidism (Primary)  - continue with current treatment. Recheck TSH today   2. Vitamin B12 deficiency  -     Vitamin B12    Check TSH and b12 levels today   F/u in 3 months   Continue with Miralax daily or every other day to help with constipation as needed. Increase fiber in diet   Suspect pain patient is noticing in side is musculoskeletal from sleeping on this side as it is not reproducible and only occurs in morning for a few minutes after laying on this side. Adjustments to sleep position discussed. Follow up if not improving

## 2021-06-22 DIAGNOSIS — E03.9 ACQUIRED HYPOTHYROIDISM: Primary | ICD-10-CM

## 2021-06-22 LAB
TSH SERPL DL<=0.005 MIU/L-ACNC: 4.74 UIU/ML (ref 0.27–4.2)
VIT B12 SERPL-MCNC: 464 PG/ML (ref 211–946)

## 2021-06-22 RX ORDER — LEVOTHYROXINE SODIUM 0.05 MG/1
50 TABLET ORAL DAILY
Qty: 30 TABLET | Refills: 1 | Status: SHIPPED | OUTPATIENT
Start: 2021-06-22 | End: 2021-09-20

## 2021-06-28 ENCOUNTER — APPOINTMENT (OUTPATIENT)
Dept: GENERAL RADIOLOGY | Facility: HOSPITAL | Age: 81
End: 2021-06-28

## 2021-06-28 ENCOUNTER — HOSPITAL ENCOUNTER (OUTPATIENT)
Facility: HOSPITAL | Age: 81
Discharge: HOME | End: 2021-06-30
Attending: EMERGENCY MEDICINE | Admitting: INTERNAL MEDICINE

## 2021-06-28 ENCOUNTER — APPOINTMENT (OUTPATIENT)
Dept: CT IMAGING | Facility: HOSPITAL | Age: 81
End: 2021-06-28

## 2021-06-28 DIAGNOSIS — R53.1 GENERALIZED WEAKNESS: ICD-10-CM

## 2021-06-28 DIAGNOSIS — J90 PLEURAL EFFUSION ON LEFT: Primary | ICD-10-CM

## 2021-06-28 PROBLEM — E03.9 HYPOTHYROIDISM (ACQUIRED): Status: ACTIVE | Noted: 2021-06-28

## 2021-06-28 LAB
ALBUMIN SERPL-MCNC: 3.3 G/DL (ref 3.5–5.2)
ALBUMIN/GLOB SERPL: 0.7 G/DL
ALP SERPL-CCNC: 66 U/L (ref 39–117)
ALT SERPL W P-5'-P-CCNC: 10 U/L (ref 1–41)
ANION GAP SERPL CALCULATED.3IONS-SCNC: 10 MMOL/L (ref 5–15)
AST SERPL-CCNC: 18 U/L (ref 1–40)
BACTERIA UR QL AUTO: NORMAL /HPF
BASOPHILS # BLD AUTO: 0.05 10*3/MM3 (ref 0–0.2)
BASOPHILS NFR BLD AUTO: 0.6 % (ref 0–1.5)
BILIRUB SERPL-MCNC: 0.2 MG/DL (ref 0–1.2)
BILIRUB UR QL STRIP: NEGATIVE
BUN SERPL-MCNC: 18 MG/DL (ref 8–23)
BUN/CREAT SERPL: 14.6 (ref 7–25)
CALCIUM SPEC-SCNC: 9.2 MG/DL (ref 8.6–10.5)
CHLORIDE SERPL-SCNC: 101 MMOL/L (ref 98–107)
CLARITY UR: ABNORMAL
CO2 SERPL-SCNC: 26 MMOL/L (ref 22–29)
COLOR UR: YELLOW
CREAT SERPL-MCNC: 1.23 MG/DL (ref 0.76–1.27)
DEPRECATED RDW RBC AUTO: 38.9 FL (ref 37–54)
EOSINOPHIL # BLD AUTO: 0.24 10*3/MM3 (ref 0–0.4)
EOSINOPHIL NFR BLD AUTO: 2.7 % (ref 0.3–6.2)
ERYTHROCYTE [DISTWIDTH] IN BLOOD BY AUTOMATED COUNT: 12.2 % (ref 12.3–15.4)
FLUAV SUBTYP SPEC NAA+PROBE: NOT DETECTED
FLUBV RNA ISLT QL NAA+PROBE: NOT DETECTED
GFR SERPL CREATININE-BSD FRML MDRD: 56 ML/MIN/1.73
GLOBULIN UR ELPH-MCNC: 4.5 GM/DL
GLUCOSE SERPL-MCNC: 99 MG/DL (ref 65–99)
GLUCOSE UR STRIP-MCNC: NEGATIVE MG/DL
HCT VFR BLD AUTO: 42 % (ref 37.5–51)
HGB BLD-MCNC: 13.1 G/DL (ref 13–17.7)
HGB UR QL STRIP.AUTO: NEGATIVE
HOLD SPECIMEN: NORMAL
HYALINE CASTS UR QL AUTO: NORMAL /LPF
IMM GRANULOCYTES # BLD AUTO: 0.04 10*3/MM3 (ref 0–0.05)
IMM GRANULOCYTES NFR BLD AUTO: 0.5 % (ref 0–0.5)
KETONES UR QL STRIP: NEGATIVE
LEUKOCYTE ESTERASE UR QL STRIP.AUTO: NEGATIVE
LYMPHOCYTES # BLD AUTO: 2.01 10*3/MM3 (ref 0.7–3.1)
LYMPHOCYTES NFR BLD AUTO: 22.7 % (ref 19.6–45.3)
MAGNESIUM SERPL-MCNC: 2.3 MG/DL (ref 1.6–2.4)
MCH RBC QN AUTO: 27.1 PG (ref 26.6–33)
MCHC RBC AUTO-ENTMCNC: 31.2 G/DL (ref 31.5–35.7)
MCV RBC AUTO: 87 FL (ref 79–97)
MONOCYTES # BLD AUTO: 0.89 10*3/MM3 (ref 0.1–0.9)
MONOCYTES NFR BLD AUTO: 10.1 % (ref 5–12)
NEUTROPHILS NFR BLD AUTO: 5.61 10*3/MM3 (ref 1.7–7)
NEUTROPHILS NFR BLD AUTO: 63.4 % (ref 42.7–76)
NITRITE UR QL STRIP: NEGATIVE
NRBC BLD AUTO-RTO: 0 /100 WBC (ref 0–0.2)
NT-PROBNP SERPL-MCNC: 61.7 PG/ML (ref 0–1800)
PH UR STRIP.AUTO: 5.5 [PH] (ref 5–8)
PLATELET # BLD AUTO: 389 10*3/MM3 (ref 140–450)
PMV BLD AUTO: 9.3 FL (ref 6–12)
POTASSIUM SERPL-SCNC: 4.4 MMOL/L (ref 3.5–5.2)
PROT SERPL-MCNC: 7.8 G/DL (ref 6–8.5)
PROT UR QL STRIP: NEGATIVE
RBC # BLD AUTO: 4.83 10*6/MM3 (ref 4.14–5.8)
RBC # UR: NORMAL /HPF
REF LAB TEST METHOD: NORMAL
SARS-COV-2 RNA PNL SPEC NAA+PROBE: NOT DETECTED
SODIUM SERPL-SCNC: 137 MMOL/L (ref 136–145)
SP GR UR STRIP: 1.01 (ref 1–1.03)
SQUAMOUS #/AREA URNS HPF: NORMAL /HPF
TROPONIN T SERPL-MCNC: <0.01 NG/ML (ref 0–0.03)
TSH SERPL DL<=0.05 MIU/L-ACNC: 3.44 UIU/ML (ref 0.27–4.2)
UROBILINOGEN UR QL STRIP: ABNORMAL
WBC # BLD AUTO: 8.84 10*3/MM3 (ref 3.4–10.8)
WBC UR QL AUTO: NORMAL /HPF
WHOLE BLOOD HOLD SPECIMEN: NORMAL

## 2021-06-28 PROCEDURE — 74177 CT ABD & PELVIS W/CONTRAST: CPT

## 2021-06-28 PROCEDURE — G0378 HOSPITAL OBSERVATION PER HR: HCPCS

## 2021-06-28 PROCEDURE — 81001 URINALYSIS AUTO W/SCOPE: CPT | Performed by: EMERGENCY MEDICINE

## 2021-06-28 PROCEDURE — 99218 PR INITIAL OBSERVATION CARE/DAY 30 MINUTES: CPT | Performed by: NURSE PRACTITIONER

## 2021-06-28 PROCEDURE — 93005 ELECTROCARDIOGRAM TRACING: CPT

## 2021-06-28 PROCEDURE — 87636 SARSCOV2 & INF A&B AMP PRB: CPT | Performed by: EMERGENCY MEDICINE

## 2021-06-28 PROCEDURE — 83735 ASSAY OF MAGNESIUM: CPT

## 2021-06-28 PROCEDURE — 84443 ASSAY THYROID STIM HORMONE: CPT

## 2021-06-28 PROCEDURE — 84484 ASSAY OF TROPONIN QUANT: CPT

## 2021-06-28 PROCEDURE — C9803 HOPD COVID-19 SPEC COLLECT: HCPCS

## 2021-06-28 PROCEDURE — 93005 ELECTROCARDIOGRAM TRACING: CPT | Performed by: EMERGENCY MEDICINE

## 2021-06-28 PROCEDURE — 71045 X-RAY EXAM CHEST 1 VIEW: CPT

## 2021-06-28 PROCEDURE — 83880 ASSAY OF NATRIURETIC PEPTIDE: CPT

## 2021-06-28 PROCEDURE — 0 IOPAMIDOL PER 1 ML: Performed by: EMERGENCY MEDICINE

## 2021-06-28 PROCEDURE — 99284 EMERGENCY DEPT VISIT MOD MDM: CPT

## 2021-06-28 PROCEDURE — 85025 COMPLETE CBC W/AUTO DIFF WBC: CPT

## 2021-06-28 PROCEDURE — 71275 CT ANGIOGRAPHY CHEST: CPT

## 2021-06-28 PROCEDURE — 80053 COMPREHEN METABOLIC PANEL: CPT

## 2021-06-28 RX ORDER — SODIUM CHLORIDE 0.9 % (FLUSH) 0.9 %
10 SYRINGE (ML) INJECTION AS NEEDED
Status: DISCONTINUED | OUTPATIENT
Start: 2021-06-28 | End: 2021-06-30 | Stop reason: HOSPADM

## 2021-06-28 RX ORDER — ONDANSETRON 2 MG/ML
4 INJECTION INTRAMUSCULAR; INTRAVENOUS EVERY 6 HOURS PRN
Status: DISCONTINUED | OUTPATIENT
Start: 2021-06-28 | End: 2021-06-30 | Stop reason: HOSPADM

## 2021-06-28 RX ORDER — MELATONIN
2000 DAILY
COMMUNITY

## 2021-06-28 RX ORDER — ACETAMINOPHEN 160 MG/5ML
650 SOLUTION ORAL EVERY 4 HOURS PRN
Status: DISCONTINUED | OUTPATIENT
Start: 2021-06-28 | End: 2021-06-30 | Stop reason: HOSPADM

## 2021-06-28 RX ORDER — ACETAMINOPHEN 325 MG/1
650 TABLET ORAL EVERY 4 HOURS PRN
Status: DISCONTINUED | OUTPATIENT
Start: 2021-06-28 | End: 2021-06-30 | Stop reason: HOSPADM

## 2021-06-28 RX ORDER — MELATONIN
2000 DAILY
Status: DISCONTINUED | OUTPATIENT
Start: 2021-06-29 | End: 2021-06-30 | Stop reason: HOSPADM

## 2021-06-28 RX ORDER — LISINOPRIL 20 MG/1
20 TABLET ORAL DAILY
Status: DISCONTINUED | OUTPATIENT
Start: 2021-06-29 | End: 2021-06-30 | Stop reason: HOSPADM

## 2021-06-28 RX ORDER — SODIUM CHLORIDE 0.9 % (FLUSH) 0.9 %
10 SYRINGE (ML) INJECTION EVERY 12 HOURS SCHEDULED
Status: DISCONTINUED | OUTPATIENT
Start: 2021-06-28 | End: 2021-06-30 | Stop reason: HOSPADM

## 2021-06-28 RX ORDER — LEVOTHYROXINE SODIUM 0.05 MG/1
50 TABLET ORAL DAILY
Status: DISCONTINUED | OUTPATIENT
Start: 2021-06-29 | End: 2021-06-30 | Stop reason: HOSPADM

## 2021-06-28 RX ORDER — ASPIRIN 81 MG/1
81 TABLET ORAL DAILY
Status: DISCONTINUED | OUTPATIENT
Start: 2021-06-29 | End: 2021-06-30 | Stop reason: HOSPADM

## 2021-06-28 RX ORDER — ACETAMINOPHEN 650 MG/1
650 SUPPOSITORY RECTAL EVERY 4 HOURS PRN
Status: DISCONTINUED | OUTPATIENT
Start: 2021-06-28 | End: 2021-06-30 | Stop reason: HOSPADM

## 2021-06-28 RX ADMIN — IOPAMIDOL 70 ML: 755 INJECTION, SOLUTION INTRAVENOUS at 17:57

## 2021-06-28 RX ADMIN — SODIUM CHLORIDE, PRESERVATIVE FREE 10 ML: 5 INJECTION INTRAVENOUS at 22:01

## 2021-06-29 ENCOUNTER — APPOINTMENT (OUTPATIENT)
Dept: GENERAL RADIOLOGY | Facility: HOSPITAL | Age: 81
End: 2021-06-29

## 2021-06-29 ENCOUNTER — APPOINTMENT (OUTPATIENT)
Dept: CARDIOLOGY | Facility: HOSPITAL | Age: 81
End: 2021-06-29

## 2021-06-29 LAB
ANION GAP SERPL CALCULATED.3IONS-SCNC: 7 MMOL/L (ref 5–15)
BASOPHILS # BLD AUTO: 0.05 10*3/MM3 (ref 0–0.2)
BASOPHILS NFR BLD AUTO: 0.7 % (ref 0–1.5)
BH CV ECHO MEAS - AO ROOT AREA (BSA CORRECTED): 1.8
BH CV ECHO MEAS - AO ROOT AREA: 12.2 CM^2
BH CV ECHO MEAS - AO ROOT DIAM: 3.9 CM
BH CV ECHO MEAS - BSA(HAYCOCK): 2.1 M^2
BH CV ECHO MEAS - BSA: 2.1 M^2
BH CV ECHO MEAS - BZI_BMI: 24.9 KILOGRAMS/M^2
BH CV ECHO MEAS - BZI_METRIC_HEIGHT: 188 CM
BH CV ECHO MEAS - BZI_METRIC_WEIGHT: 88 KG
BH CV ECHO MEAS - EDV(CUBED): 32.4 ML
BH CV ECHO MEAS - EDV(MOD-SP2): 108 ML
BH CV ECHO MEAS - EDV(MOD-SP4): 45 ML
BH CV ECHO MEAS - EDV(TEICH): 40.6 ML
BH CV ECHO MEAS - EF(CUBED): 51.9 %
BH CV ECHO MEAS - EF(MOD-BP): 52 %
BH CV ECHO MEAS - EF(MOD-SP2): 52.8 %
BH CV ECHO MEAS - EF(MOD-SP4): 53.3 %
BH CV ECHO MEAS - EF(TEICH): 45.1 %
BH CV ECHO MEAS - ESV(CUBED): 15.6 ML
BH CV ECHO MEAS - ESV(MOD-SP2): 51 ML
BH CV ECHO MEAS - ESV(MOD-SP4): 21 ML
BH CV ECHO MEAS - ESV(TEICH): 22.3 ML
BH CV ECHO MEAS - FS: 21.7 %
BH CV ECHO MEAS - IVS/LVPW: 0.84
BH CV ECHO MEAS - IVSD: 0.81 CM
BH CV ECHO MEAS - LA DIMENSION: 2.1 CM
BH CV ECHO MEAS - LA/AO: 0.53
BH CV ECHO MEAS - LAD MAJOR: 5.1 CM
BH CV ECHO MEAS - LAT PEAK E' VEL: 7.6 CM/SEC
BH CV ECHO MEAS - LATERAL E/E' RATIO: 7.5
BH CV ECHO MEAS - LV DIASTOLIC VOL/BSA (35-75): 21 ML/M^2
BH CV ECHO MEAS - LV MASS(C)D: 75.1 GRAMS
BH CV ECHO MEAS - LV MASS(C)DI: 35 GRAMS/M^2
BH CV ECHO MEAS - LV MAX PG: 3.9 MMHG
BH CV ECHO MEAS - LV MEAN PG: 1.5 MMHG
BH CV ECHO MEAS - LV SYSTOLIC VOL/BSA (12-30): 9.8 ML/M^2
BH CV ECHO MEAS - LV V1 MAX: 98.2 CM/SEC
BH CV ECHO MEAS - LV V1 MEAN: 54.5 CM/SEC
BH CV ECHO MEAS - LV V1 VTI: 20.2 CM
BH CV ECHO MEAS - LVIDD: 3.2 CM
BH CV ECHO MEAS - LVIDS: 2.5 CM
BH CV ECHO MEAS - LVLD AP2: 8.5 CM
BH CV ECHO MEAS - LVLD AP4: 7 CM
BH CV ECHO MEAS - LVLS AP2: 7.7 CM
BH CV ECHO MEAS - LVLS AP4: 5.9 CM
BH CV ECHO MEAS - LVOT AREA (M): 3.1 CM^2
BH CV ECHO MEAS - LVOT AREA: 3.2 CM^2
BH CV ECHO MEAS - LVOT DIAM: 2 CM
BH CV ECHO MEAS - LVPWD: 0.96 CM
BH CV ECHO MEAS - MED PEAK E' VEL: 5.6 CM/SEC
BH CV ECHO MEAS - MEDIAL E/E' RATIO: 10.1
BH CV ECHO MEAS - MV A MAX VEL: 89.8 CM/SEC
BH CV ECHO MEAS - MV E MAX VEL: 58.2 CM/SEC
BH CV ECHO MEAS - MV E/A: 0.65
BH CV ECHO MEAS - MV MAX PG: 4.7 MMHG
BH CV ECHO MEAS - MV MEAN PG: 1.8 MMHG
BH CV ECHO MEAS - MV V2 MAX: 108.6 CM/SEC
BH CV ECHO MEAS - MV V2 MEAN: 60.8 CM/SEC
BH CV ECHO MEAS - MV V2 VTI: 18.2 CM
BH CV ECHO MEAS - MVA(VTI): 3.5 CM^2
BH CV ECHO MEAS - PA ACC SLOPE: 507.7 CM/SEC^2
BH CV ECHO MEAS - PA ACC TIME: 0.13 SEC
BH CV ECHO MEAS - PA PR(ACCEL): 20.4 MMHG
BH CV ECHO MEAS - SI(CUBED): 7.8 ML/M^2
BH CV ECHO MEAS - SI(LVOT): 30.1 ML/M^2
BH CV ECHO MEAS - SI(MOD-SP2): 26.6 ML/M^2
BH CV ECHO MEAS - SI(MOD-SP4): 11.2 ML/M^2
BH CV ECHO MEAS - SI(TEICH): 8.5 ML/M^2
BH CV ECHO MEAS - SV(CUBED): 16.8 ML
BH CV ECHO MEAS - SV(LVOT): 64.5 ML
BH CV ECHO MEAS - SV(MOD-SP2): 57 ML
BH CV ECHO MEAS - SV(MOD-SP4): 24 ML
BH CV ECHO MEAS - SV(TEICH): 18.3 ML
BH CV ECHO MEAS - TAPSE (>1.6): 2.1 CM
BH CV ECHO MEASUREMENTS AVERAGE E/E' RATIO: 8.82
BH CV VAS BP LEFT ARM: NORMAL MMHG
BH CV XLRA - RV BASE: 5.3 CM
BH CV XLRA - RV LENGTH: 6.3 CM
BH CV XLRA - RV MID: 3.9 CM
BH CV XLRA - TDI S': 15.5 CM/SEC
BUN SERPL-MCNC: 14 MG/DL (ref 8–23)
BUN/CREAT SERPL: 13.3 (ref 7–25)
CALCIUM SPEC-SCNC: 8.8 MG/DL (ref 8.6–10.5)
CHLORIDE SERPL-SCNC: 100 MMOL/L (ref 98–107)
CO2 SERPL-SCNC: 27 MMOL/L (ref 22–29)
CREAT SERPL-MCNC: 1.05 MG/DL (ref 0.76–1.27)
DEPRECATED RDW RBC AUTO: 39.2 FL (ref 37–54)
EOSINOPHIL # BLD AUTO: 0.29 10*3/MM3 (ref 0–0.4)
EOSINOPHIL NFR BLD AUTO: 4 % (ref 0.3–6.2)
ERYTHROCYTE [DISTWIDTH] IN BLOOD BY AUTOMATED COUNT: 12.2 % (ref 12.3–15.4)
GFR SERPL CREATININE-BSD FRML MDRD: 68 ML/MIN/1.73
GLUCOSE SERPL-MCNC: 84 MG/DL (ref 65–99)
HCT VFR BLD AUTO: 41 % (ref 37.5–51)
HGB BLD-MCNC: 12.7 G/DL (ref 13–17.7)
IMM GRANULOCYTES # BLD AUTO: 0.02 10*3/MM3 (ref 0–0.05)
IMM GRANULOCYTES NFR BLD AUTO: 0.3 % (ref 0–0.5)
INR PPP: 1.15 (ref 0.85–1.16)
LDH SERPL-CCNC: 148 U/L (ref 135–225)
LEFT ATRIUM VOLUME INDEX: 15.8 ML/M^2
LEFT ATRIUM VOLUME: 34 ML
LYMPHOCYTES # BLD AUTO: 1.26 10*3/MM3 (ref 0.7–3.1)
LYMPHOCYTES NFR BLD AUTO: 17.2 % (ref 19.6–45.3)
MAXIMAL PREDICTED HEART RATE: 138 BPM
MCH RBC QN AUTO: 27.3 PG (ref 26.6–33)
MCHC RBC AUTO-ENTMCNC: 31 G/DL (ref 31.5–35.7)
MCV RBC AUTO: 88.2 FL (ref 79–97)
MONOCYTES # BLD AUTO: 0.81 10*3/MM3 (ref 0.1–0.9)
MONOCYTES NFR BLD AUTO: 11.1 % (ref 5–12)
NEUTROPHILS NFR BLD AUTO: 4.89 10*3/MM3 (ref 1.7–7)
NEUTROPHILS NFR BLD AUTO: 66.7 % (ref 42.7–76)
NRBC BLD AUTO-RTO: 0 /100 WBC (ref 0–0.2)
PLATELET # BLD AUTO: 347 10*3/MM3 (ref 140–450)
PMV BLD AUTO: 9.5 FL (ref 6–12)
POTASSIUM SERPL-SCNC: 4.4 MMOL/L (ref 3.5–5.2)
PROTHROMBIN TIME: 14.3 SECONDS (ref 11.4–14.4)
RBC # BLD AUTO: 4.65 10*6/MM3 (ref 4.14–5.8)
SODIUM SERPL-SCNC: 134 MMOL/L (ref 136–145)
STRESS TARGET HR: 117 BPM
WBC # BLD AUTO: 7.32 10*3/MM3 (ref 3.4–10.8)

## 2021-06-29 PROCEDURE — G0378 HOSPITAL OBSERVATION PER HR: HCPCS

## 2021-06-29 PROCEDURE — 25010000002 SULFUR HEXAFLUORIDE MICROSPH 60.7-25 MG RECONSTITUTED SUSPENSION: Performed by: INTERNAL MEDICINE

## 2021-06-29 PROCEDURE — 80048 BASIC METABOLIC PNL TOTAL CA: CPT | Performed by: NURSE PRACTITIONER

## 2021-06-29 PROCEDURE — 85610 PROTHROMBIN TIME: CPT | Performed by: NURSE PRACTITIONER

## 2021-06-29 PROCEDURE — 71045 X-RAY EXAM CHEST 1 VIEW: CPT

## 2021-06-29 PROCEDURE — 93306 TTE W/DOPPLER COMPLETE: CPT | Performed by: INTERNAL MEDICINE

## 2021-06-29 PROCEDURE — 83615 LACTATE (LD) (LDH) ENZYME: CPT | Performed by: INTERNAL MEDICINE

## 2021-06-29 PROCEDURE — 85025 COMPLETE CBC W/AUTO DIFF WBC: CPT | Performed by: NURSE PRACTITIONER

## 2021-06-29 PROCEDURE — 99226 PR SBSQ OBSERVATION CARE/DAY 35 MINUTES: CPT | Performed by: INTERNAL MEDICINE

## 2021-06-29 PROCEDURE — 93306 TTE W/DOPPLER COMPLETE: CPT

## 2021-06-29 RX ADMIN — LEVOTHYROXINE SODIUM 50 MCG: 50 TABLET ORAL at 08:23

## 2021-06-29 RX ADMIN — LISINOPRIL 20 MG: 20 TABLET ORAL at 08:23

## 2021-06-29 RX ADMIN — SODIUM CHLORIDE, PRESERVATIVE FREE 10 ML: 5 INJECTION INTRAVENOUS at 21:06

## 2021-06-29 RX ADMIN — SODIUM CHLORIDE, PRESERVATIVE FREE 10 ML: 5 INJECTION INTRAVENOUS at 08:25

## 2021-06-29 RX ADMIN — SULFUR HEXAFLUORIDE 3 ML: KIT at 15:45

## 2021-06-29 RX ADMIN — ASPIRIN 81 MG: 81 TABLET, COATED ORAL at 08:23

## 2021-06-29 RX ADMIN — Medication 2000 UNITS: at 08:24

## 2021-06-30 ENCOUNTER — APPOINTMENT (OUTPATIENT)
Dept: GENERAL RADIOLOGY | Facility: HOSPITAL | Age: 81
End: 2021-06-30

## 2021-06-30 ENCOUNTER — TELEPHONE (OUTPATIENT)
Dept: FAMILY MEDICINE CLINIC | Facility: CLINIC | Age: 81
End: 2021-06-30

## 2021-06-30 ENCOUNTER — READMISSION MANAGEMENT (OUTPATIENT)
Dept: CALL CENTER | Facility: HOSPITAL | Age: 81
End: 2021-06-30

## 2021-06-30 VITALS
BODY MASS INDEX: 24.9 KG/M2 | WEIGHT: 194 LBS | TEMPERATURE: 97.4 F | DIASTOLIC BLOOD PRESSURE: 71 MMHG | RESPIRATION RATE: 18 BRPM | SYSTOLIC BLOOD PRESSURE: 119 MMHG | HEART RATE: 111 BPM | OXYGEN SATURATION: 94 % | HEIGHT: 74 IN

## 2021-06-30 DIAGNOSIS — J90 PLEURAL EFFUSION: Primary | ICD-10-CM

## 2021-06-30 LAB
ALBUMIN SERPL-MCNC: 2.8 G/DL (ref 3.5–5.2)
ALBUMIN/GLOB SERPL: 0.8 G/DL
ALP SERPL-CCNC: 59 U/L (ref 39–117)
ALT SERPL W P-5'-P-CCNC: 7 U/L (ref 1–41)
ANION GAP SERPL CALCULATED.3IONS-SCNC: 9 MMOL/L (ref 5–15)
AST SERPL-CCNC: 13 U/L (ref 1–40)
BILIRUB SERPL-MCNC: 0.4 MG/DL (ref 0–1.2)
BUN SERPL-MCNC: 15 MG/DL (ref 8–23)
BUN/CREAT SERPL: 15.8 (ref 7–25)
CALCIUM SPEC-SCNC: 8.7 MG/DL (ref 8.6–10.5)
CHLORIDE SERPL-SCNC: 104 MMOL/L (ref 98–107)
CO2 SERPL-SCNC: 24 MMOL/L (ref 22–29)
CREAT SERPL-MCNC: 0.95 MG/DL (ref 0.76–1.27)
DEPRECATED RDW RBC AUTO: 38.5 FL (ref 37–54)
ERYTHROCYTE [DISTWIDTH] IN BLOOD BY AUTOMATED COUNT: 12 % (ref 12.3–15.4)
GFR SERPL CREATININE-BSD FRML MDRD: 76 ML/MIN/1.73
GLOBULIN UR ELPH-MCNC: 3.7 GM/DL
GLUCOSE SERPL-MCNC: 86 MG/DL (ref 65–99)
HCT VFR BLD AUTO: 41.7 % (ref 37.5–51)
HGB BLD-MCNC: 13 G/DL (ref 13–17.7)
MAGNESIUM SERPL-MCNC: 1.9 MG/DL (ref 1.6–2.4)
MCH RBC QN AUTO: 27.1 PG (ref 26.6–33)
MCHC RBC AUTO-ENTMCNC: 31.2 G/DL (ref 31.5–35.7)
MCV RBC AUTO: 86.9 FL (ref 79–97)
PLATELET # BLD AUTO: 351 10*3/MM3 (ref 140–450)
PMV BLD AUTO: 9.7 FL (ref 6–12)
POTASSIUM SERPL-SCNC: 4.1 MMOL/L (ref 3.5–5.2)
PROT SERPL-MCNC: 6.5 G/DL (ref 6–8.5)
RBC # BLD AUTO: 4.8 10*6/MM3 (ref 4.14–5.8)
SODIUM SERPL-SCNC: 137 MMOL/L (ref 136–145)
WBC # BLD AUTO: 8.01 10*3/MM3 (ref 3.4–10.8)

## 2021-06-30 PROCEDURE — A9270 NON-COVERED ITEM OR SERVICE: HCPCS | Performed by: NURSE PRACTITIONER

## 2021-06-30 PROCEDURE — 63710000001 LEVOTHYROXINE 50 MCG TABLET: Performed by: NURSE PRACTITIONER

## 2021-06-30 PROCEDURE — 63710000001 ASPIRIN 81 MG TABLET DELAYED-RELEASE: Performed by: NURSE PRACTITIONER

## 2021-06-30 PROCEDURE — 63710000001 CHOLECALCIFEROL 25 MCG (1000 UT) TABLET: Performed by: NURSE PRACTITIONER

## 2021-06-30 PROCEDURE — 25010000003 MAGNESIUM SULFATE 4 GM/100ML SOLUTION: Performed by: PHYSICIAN ASSISTANT

## 2021-06-30 PROCEDURE — 85027 COMPLETE CBC AUTOMATED: CPT | Performed by: INTERNAL MEDICINE

## 2021-06-30 PROCEDURE — 63710000001 LISINOPRIL 20 MG TABLET: Performed by: NURSE PRACTITIONER

## 2021-06-30 PROCEDURE — 80053 COMPREHEN METABOLIC PANEL: CPT | Performed by: INTERNAL MEDICINE

## 2021-06-30 PROCEDURE — 99217 PR OBSERVATION CARE DISCHARGE MANAGEMENT: CPT | Performed by: INTERNAL MEDICINE

## 2021-06-30 PROCEDURE — 83735 ASSAY OF MAGNESIUM: CPT | Performed by: INTERNAL MEDICINE

## 2021-06-30 PROCEDURE — G0378 HOSPITAL OBSERVATION PER HR: HCPCS

## 2021-06-30 PROCEDURE — 71045 X-RAY EXAM CHEST 1 VIEW: CPT

## 2021-06-30 RX ORDER — MAGNESIUM SULFATE HEPTAHYDRATE 40 MG/ML
2 INJECTION, SOLUTION INTRAVENOUS AS NEEDED
Status: DISCONTINUED | OUTPATIENT
Start: 2021-06-30 | End: 2021-06-30 | Stop reason: HOSPADM

## 2021-06-30 RX ORDER — MAGNESIUM SULFATE HEPTAHYDRATE 40 MG/ML
4 INJECTION, SOLUTION INTRAVENOUS AS NEEDED
Status: DISCONTINUED | OUTPATIENT
Start: 2021-06-30 | End: 2021-06-30 | Stop reason: HOSPADM

## 2021-06-30 RX ADMIN — Medication 2000 UNITS: at 08:59

## 2021-06-30 RX ADMIN — LEVOTHYROXINE SODIUM 50 MCG: 50 TABLET ORAL at 05:39

## 2021-06-30 RX ADMIN — ASPIRIN 81 MG: 81 TABLET, COATED ORAL at 08:59

## 2021-06-30 RX ADMIN — MAGNESIUM SULFATE HEPTAHYDRATE 4 G: 40 INJECTION, SOLUTION INTRAVENOUS at 05:39

## 2021-06-30 RX ADMIN — LISINOPRIL 20 MG: 20 TABLET ORAL at 08:59

## 2021-06-30 NOTE — OUTREACH NOTE
Prep Survey      Responses   [facility] [place] patient discharged from?  [place]   Is LACE score < 7 ?  Yes   Emergency Room discharge w/ pulse ox?  No   Eligibility  [facility]   Date of Admission  06/28/21   Date of Discharge  06/30/21   Discharge Disposition  Home or Self Care   Discharge diagnosis  Pleural effusion on left   Does the patient have one of the following disease processes/diagnoses(primary or secondary)?  Other   Does the patient have Home health ordered?  No   Is there a DME ordered?  No   Prep survey completed?  Yes          Angela Tom RN

## 2021-07-01 ENCOUNTER — TRANSITIONAL CARE MANAGEMENT TELEPHONE ENCOUNTER (OUTPATIENT)
Dept: CALL CENTER | Facility: HOSPITAL | Age: 81
End: 2021-07-01

## 2021-07-01 NOTE — OUTREACH NOTE
Call Center TCM Note      Responses   [facility] patient discharged from?  [facility]   Does the patient have one of the following disease processes/diagnoses(primary or secondary)?  Other   TCM attempt successful?  Yes   Call start time  1522   Call end time  1539   Discharge diagnosis  Pleural effusion on left   Person spoke with today (if not patient) and relationship  Patient   Meds reviewed with patient/caregiver?  Yes   Is the patient having any side effects they believe may be caused by any medication additions or changes?  No   Does the patient have all medications ordered at discharge?  N/A   Is the patient taking all medications as directed (includes completed medication regime)?  Yes   Does the patient have a primary care provider?   Yes   Does the patient have an appointment with their PCP within 7 days of discharge?  Greater than 7 days   Comments regarding PCP  hospital dc fu appt on 7/8/21 at 12:00 PM   What is preventing the patient from scheduling follow up appointments within 7 days of discharge?  Earlier appointment not available   Nursing Interventions  Verified appointment date/time/provider   Has the patient kept scheduled appointments due by today?  N/A   Psychosocial issues?  No   Did the patient receive a copy of their discharge instructions?  Yes   Nursing interventions  Reviewed instructions with patient   What is the patient's perception of their health status since discharge?  Improving   Is the patient/caregiver able to teach back signs and symptoms related to disease process for when to call PCP?  Yes   Is the patient/caregiver able to teach back signs and symptoms related to disease process for when to call 911?  Yes   Is the patient/caregiver able to teach back the hierarchy of who to call/visit for symptoms/problems? PCP, Specialist, Home health nurse, Urgent Care, ED, 911  Yes   If the patient is a current smoker, are they able to teach back resources for cessation?  Not a  smoker   TCM call completed?  Yes   Wrap up additional comments  Pt states he is better than when in hospital. Pt verified PCP hospital dc fu appt on 7/8/21. Questions/concerns addressed.           Roro Liang RN    7/1/2021, 15:41 EDT

## 2021-07-01 NOTE — OUTREACH NOTE
Case Management Call Center Follow-up      Responses   [facility] Call Center Tracking Reason?  Refer to: Patient Experience   Has the Call Center Case Management Follow-up issue been resolved?  Yes   Follow-up Comments  SW has reviewed the report and will request Patient Experience contact pt as soon as possible.          BHAVESH Hu    7/1/2021, 16:01 EDT

## 2021-07-02 NOTE — POST-PROCEDURE NOTE
Interventional Radiology Operative Note    Date: 07/02/21     Time: 17:09 EDT     Pre-op Diagnosis: left thoracentesis  Post-op Diagnosis: same    Procedure: US left thoracentesis    Surgeon: Rose Piper MD   Assistants: None    Sedation: None.    Estimated Blood Loss (EBL): Trace     Urine Output (UOP): N/A (short procedure)    IVF: N/A (short procedure)    Findings: Large left effusion.    Specimens: 2100 mL yellow pleural fluid    Complications: None.    Disposition: Patient to recovery.

## 2021-07-06 NOTE — PROGRESS NOTES
Chief Complaint  Coronary Artery Disease and Establish Care    Subjective    History of Present Illness {CC  Problem List  Visit  Diagnosis   Encounters  Notes  Medications  Labs  Result Review Imaging  Media :23}     Palomo Rainey, 81 y.o. male with CAD and NSTEMI 8/2018 S/P TYSON to circumflex, pAF, CVA, hypothyroid, HTN presents to [facility] for Coronary Artery Disease and Establish Care    Patient recently hospitalized at [facility] for complaint of 3-4 week history of progressive weakness and shortness of breath who was found to have concern for large left pleural effusion.  Thoracentesis completed 6/29/21 with 2100mL reportedly clear yellow fluid removed, Dr. Min with pulmonary thought effusion not likely CHF since on the left rather than the right (only has mild impaired diastolic dysfunction), unlikely a parapneumonic effusion without other symptoms and normal wbc.  There were no masses or lesions found on his CT chest, abd/pelvis.  Patient and family aware that malignant effusion is a possibility but only way to know would be to wait for fluid to reacumulate and then do another thoracentesis to send fluid.  Pulmonary office will call patient with a prompt appointment.  CTA showed large left pleural effusion, no PE. CT abd/pelvis show no acute findings. Echocardiogram revealed EF 56-60%, LV diastolic function c/w grade I impaired relaxation, no significant Valvular disease. Follows with Dr. Cantu for cardiology needs.    Since hospital discharge patient reports he has been doing well overall; notes some increased fatigue that he relates possibly to magnesium IV infusion while hospitalized.  He denies chest pain, dyspnea, lightheadedness/dizziness, palpitations, irregular rhythms, and syncope. Notes that heart rates have been increased on levothyroxine; states no irregular rhythms, just faster.  ECG in hospital reported sinus tachycardia.  She  "states that he has a PCP appointment tomorrow and will discuss levothyroxine dosing, and also a pulmonary appointment tomorrow.      Objective     Vital Signs:   Vitals:    07/07/21 0902 07/07/21 0904 07/07/21 0905   BP: 117/75 115/70 142/72   BP Location: Right arm Left arm Left arm   Patient Position: Sitting Standing Sitting   Cuff Size: Adult Adult Adult   Pulse: 105 116 104   Resp:   23   Temp:   98.4 °F (36.9 °C)   TempSrc:   Temporal   SpO2: 94% 97% 98%   Weight:   85.9 kg (189 lb 6 oz)   Height:   188 cm (74\")     Body mass index is 24.31 kg/m².  Physical Exam  Vitals and nursing note reviewed.   Constitutional:       Appearance: Normal appearance.   HENT:      Head: Normocephalic.   Eyes:      Extraocular Movements: Extraocular movements intact.   Neck:      Vascular: No carotid bruit.   Cardiovascular:      Rate and Rhythm: Regular rhythm. Tachycardia present.      Pulses: Normal pulses.      Heart sounds: Normal heart sounds, S1 normal and S2 normal. No murmur heard.        Comments: Regular rate/rhythm  Pulmonary:      Effort: Pulmonary effort is normal. No respiratory distress.      Breath sounds: Normal breath sounds.   Musculoskeletal:      Cervical back: Neck supple.      Right lower leg: No edema.      Left lower leg: No edema.   Skin:     General: Skin is warm and dry.   Neurological:      General: No focal deficit present.      Mental Status: He is alert.   Psychiatric:         Mood and Affect: Mood normal.         Behavior: Behavior normal.         Thought Content: Thought content normal.        Data Reviewed:{ Labs  Result Review  Imaging  Med Tab  Media :23}     Comprehensive Metabolic Panel (06/30/2021 03:37)  BNP (06/28/2021 14:22)  Magnesium (06/30/2021 03:37)  CBC (No Diff) (06/30/2021 03:37)  XR Chest 1 View (06/30/2021 11:30)  CT Angiogram Chest (06/28/2021 17:58)  Adult Transthoracic Echo Complete W/ Cont if Necessary Per Protocol (06/29/2021 16:20)  Adult Transthoracic Echo Complete " W/ Cont if Necessary Per Protocol (08/05/2018 11:03)  Cardiac Catheterization/Vascular Study (08/04/2018 12:24)      Assessment and Plan {CC Problem List  Visit Diagnosis  ROS  Review (Popup)  Health Maintenance  Quality  BestPractice  Medications  SmartSets  SnapShot Encounters  Media :23}     1. Coronary artery disease involving native coronary artery of native heart without angina pectoris  - Continue aspirin  - Myalgias with atorvastatin, rosuvastatin.  Patient reluctant to try another statin at this time.   - Basic Metabolic Panel; Future  - Magnesium; Future  -Follow-up with Dr. Cantu is scheduled for 8/30/2021.  We will message scheduling to expedite scheduling.    2. Tachycardia  - Notes increased heart rates since being initiated on levothyroxine.  Denies irregular rhythms  - ECG with sinus tachycardia during recent hospitalization  -PCP appointment tomorrow, states he will discuss tachycardia and levothyroxine correlation    3. Essential hypertension  -Well-controlled at time of visit  -Continue lisinopril    4. History of CVA (cerebrovascular accident)  -Continue ASA      Follow Up {Instructions Charge Capture  Follow-up Communications :23}   Return if symptoms worsen or fail to improve.    Patient was given instructions and counseling regarding his condition or for health maintenance advice. Please see specific information pulled into the AVS if appropriate.  Patient was instructed to call the [facility] with any questions, concerns, or worsening symptoms.    *Please note that portions of this note were completed with a voice recognition program. Efforts were made to edit the dictations, but occasionally words are mistranscribed.

## 2021-07-07 ENCOUNTER — OFFICE VISIT (OUTPATIENT)
Dept: CARDIOLOGY | Facility: HOSPITAL | Age: 81
End: 2021-07-07

## 2021-07-07 ENCOUNTER — LAB (OUTPATIENT)
Dept: LAB | Facility: HOSPITAL | Age: 81
End: 2021-07-07

## 2021-07-07 VITALS
OXYGEN SATURATION: 98 % | RESPIRATION RATE: 23 BRPM | SYSTOLIC BLOOD PRESSURE: 142 MMHG | DIASTOLIC BLOOD PRESSURE: 72 MMHG | TEMPERATURE: 98.4 F | WEIGHT: 189.38 LBS | HEART RATE: 104 BPM | HEIGHT: 74 IN | BODY MASS INDEX: 24.3 KG/M2

## 2021-07-07 DIAGNOSIS — I25.10 CORONARY ARTERY DISEASE INVOLVING NATIVE CORONARY ARTERY OF NATIVE HEART WITHOUT ANGINA PECTORIS: Primary | ICD-10-CM

## 2021-07-07 DIAGNOSIS — I10 ESSENTIAL HYPERTENSION: ICD-10-CM

## 2021-07-07 DIAGNOSIS — E03.9 ACQUIRED HYPOTHYROIDISM: ICD-10-CM

## 2021-07-07 DIAGNOSIS — R00.0 TACHYCARDIA: ICD-10-CM

## 2021-07-07 DIAGNOSIS — Z86.73 HISTORY OF CVA (CEREBROVASCULAR ACCIDENT): ICD-10-CM

## 2021-07-07 DIAGNOSIS — I25.10 CORONARY ARTERY DISEASE INVOLVING NATIVE CORONARY ARTERY OF NATIVE HEART WITHOUT ANGINA PECTORIS: ICD-10-CM

## 2021-07-07 LAB
ANION GAP SERPL CALCULATED.3IONS-SCNC: 9.6 MMOL/L (ref 5–15)
BUN SERPL-MCNC: 18 MG/DL (ref 8–23)
BUN/CREAT SERPL: 16.8 (ref 7–25)
CALCIUM SPEC-SCNC: 9.1 MG/DL (ref 8.6–10.5)
CHLORIDE SERPL-SCNC: 102 MMOL/L (ref 98–107)
CO2 SERPL-SCNC: 25.4 MMOL/L (ref 22–29)
CREAT SERPL-MCNC: 1.07 MG/DL (ref 0.76–1.27)
GFR SERPL CREATININE-BSD FRML MDRD: 66 ML/MIN/1.73
GLUCOSE SERPL-MCNC: 82 MG/DL (ref 65–99)
MAGNESIUM SERPL-MCNC: 2 MG/DL (ref 1.6–2.4)
POTASSIUM SERPL-SCNC: 4.5 MMOL/L (ref 3.5–5.2)
SODIUM SERPL-SCNC: 137 MMOL/L (ref 136–145)

## 2021-07-07 PROCEDURE — 99214 OFFICE O/P EST MOD 30 MIN: CPT | Performed by: NURSE PRACTITIONER

## 2021-07-07 PROCEDURE — 83735 ASSAY OF MAGNESIUM: CPT

## 2021-07-07 PROCEDURE — 84443 ASSAY THYROID STIM HORMONE: CPT | Performed by: PHYSICIAN ASSISTANT

## 2021-07-07 PROCEDURE — 36415 COLL VENOUS BLD VENIPUNCTURE: CPT

## 2021-07-07 PROCEDURE — 80048 BASIC METABOLIC PNL TOTAL CA: CPT

## 2021-07-08 ENCOUNTER — OFFICE VISIT (OUTPATIENT)
Dept: FAMILY MEDICINE CLINIC | Facility: CLINIC | Age: 81
End: 2021-07-08

## 2021-07-08 ENCOUNTER — OFFICE VISIT (OUTPATIENT)
Dept: PULMONOLOGY | Facility: CLINIC | Age: 81
End: 2021-07-08

## 2021-07-08 VITALS
WEIGHT: 189 LBS | OXYGEN SATURATION: 96 % | TEMPERATURE: 98.2 F | DIASTOLIC BLOOD PRESSURE: 82 MMHG | BODY MASS INDEX: 24.26 KG/M2 | HEART RATE: 110 BPM | SYSTOLIC BLOOD PRESSURE: 134 MMHG | HEIGHT: 74 IN

## 2021-07-08 VITALS
OXYGEN SATURATION: 95 % | DIASTOLIC BLOOD PRESSURE: 62 MMHG | HEIGHT: 74 IN | BODY MASS INDEX: 24.33 KG/M2 | SYSTOLIC BLOOD PRESSURE: 108 MMHG | HEART RATE: 108 BPM | WEIGHT: 189.6 LBS

## 2021-07-08 DIAGNOSIS — J90 PLEURAL EFFUSION ON LEFT: Primary | ICD-10-CM

## 2021-07-08 DIAGNOSIS — R00.0 TACHYCARDIA: ICD-10-CM

## 2021-07-08 DIAGNOSIS — R06.02 SHORTNESS OF BREATH: ICD-10-CM

## 2021-07-08 DIAGNOSIS — J90 PLEURAL EFFUSION ON LEFT: ICD-10-CM

## 2021-07-08 DIAGNOSIS — I10 ESSENTIAL HYPERTENSION: ICD-10-CM

## 2021-07-08 DIAGNOSIS — Z09 HOSPITAL DISCHARGE FOLLOW-UP: Primary | ICD-10-CM

## 2021-07-08 DIAGNOSIS — R19.4 CHANGE IN BOWEL HABITS: ICD-10-CM

## 2021-07-08 PROCEDURE — 99495 TRANSJ CARE MGMT MOD F2F 14D: CPT | Performed by: PHYSICIAN ASSISTANT

## 2021-07-08 PROCEDURE — 96372 THER/PROPH/DIAG INJ SC/IM: CPT | Performed by: PHYSICIAN ASSISTANT

## 2021-07-08 PROCEDURE — 99213 OFFICE O/P EST LOW 20 MIN: CPT | Performed by: NURSE PRACTITIONER

## 2021-07-08 PROCEDURE — 1111F DSCHRG MED/CURRENT MED MERGE: CPT | Performed by: PHYSICIAN ASSISTANT

## 2021-07-08 RX ORDER — NEBIVOLOL 5 MG/1
5 TABLET ORAL DAILY
Qty: 30 TABLET | Refills: 1 | Status: SHIPPED | OUTPATIENT
Start: 2021-07-08 | End: 2021-08-26 | Stop reason: DRUGHIGH

## 2021-07-08 RX ADMIN — CYANOCOBALAMIN 1000 MCG: 1000 INJECTION, SOLUTION INTRAMUSCULAR; SUBCUTANEOUS at 12:39

## 2021-07-08 NOTE — PROGRESS NOTES
"Transitional Care Follow Up Visit  Subjective     Palomo Rainey is a 81 y.o. male who presents for a transitional care management visit.    Within 48 business hours after discharge our office contacted him via telephone to coordinate his care and needs.      I reviewed and discussed the details of that call along with the discharge summary, hospital problems, inpatient lab results, inpatient diagnostic studies, and consultation reports with Palomo.     Current outpatient and discharge medications have been reconciled for the patient.  Reviewed by: KEYON Hillman      Date of TCM Phone Call 6/30/2021   [facility]   Date of Admission 6/28/2021   Date of Discharge 6/30/2021   Discharge Disposition Home or Self Care     Risk for Readmission (LACE) Score: 2 (6/30/2021  6:01 AM)      History of Present Illness   Course During Hospital Stay:       \"Palomo Rainey is a 81 y.o. male with PMH significant for CAD, CVA, hypothyroid, HTN who presents to the ED with complaint of 3-4 week history of progressive weakness and shortness of breath who is found to have concern for large left pleural effusion.  Left Pleural Effusion   -s/p thoracentesis 6/29 with 2100mL reportedly clear yellow fluid removed, called and discussed with IR on the day of procedure and again today.   Labs were ordered to be sent with fluid but IR missed the labs and did not send and disposed of fluid so no way to send labs without repeating procedure.  Unfortunately without labs sent on the fluid I am unable to determine the etiology of his Pleural effusion.  I called and discussed with Dr. Min with pulmonary and she doesn't think they d/t CHF since on the left rather than the right (only has mild impaired diastolic dysfunction), unlikely a parapneumonic effusion without other symptoms and normal wbc.  There were no masses or lesions found on his CT chest, abd/pelvis.  Patient and family aware that malignant " "effusion is a possibility but only way to know would be to wait for fluid to reacumulate and then do another thoracentesis to send fluid.  Pulmonary office will call patient with a prompt appointment.  -CTA showed large left pleural effusion, no PE.  CT abd/pelvis show no acute findings  -echo shows EF 56-60%, LV diastolic function c/w grade I impaired relaxation, no significant Valvular disease  -Post procedure CXR showed relatively mild remaining left basilar atelectasis.  -Today CXR showed a tiny persistent left pleural effusion with associated basilar atelectasis.  Hypertension   CAD  Hx CVA  -reports episodes of hypotension recently   -continue lisinopril as BP has done well during hospitalizatoin  -continue daily ASA  Hypothyroid   -New diagnosis, adjusting meds recently, tsh now wnl.  continue synthroid\"    Recent cardiology consult   \"Patient recently hospitalized at [facility] for complaint of 3-4 week history of progressive weakness and shortness of breath who was found to have concern for large left pleural effusion.  Thoracentesis completed 6/29/21 with 2100mL reportedly clear yellow fluid removed, Dr. Min with pulmonary thought effusion not likely CHF since on the left rather than the right (only has mild impaired diastolic dysfunction), unlikely a parapneumonic effusion without other symptoms and normal wbc.  There were no masses or lesions found on his CT chest, abd/pelvis.  Patient and family aware that malignant effusion is a possibility but only way to know would be to wait for fluid to reacumulate and then do another thoracentesis to send fluid.  Pulmonary office will call patient with a prompt appointment.  CTA showed large left pleural effusion, no PE. CT abd/pelvis show no acute findings. Echocardiogram revealed EF 56-60%, LV diastolic function c/w grade I impaired relaxation, no significant Valvular disease. Follows with Dr. Cantu for cardiology needs.     Since hospital " "discharge patient reports he has been doing well overall; notes some increased fatigue that he relates possibly to magnesium IV infusion while hospitalized.  He denies chest pain, dyspnea, lightheadedness/dizziness, palpitations, irregular rhythms, and syncope. Notes that heart rates have been increased on levothyroxine; states no irregular rhythms, just faster.  ECG in hospital reported sinus tachycardia.  She states that he has a PCP appointment tomorrow and will discuss levothyroxine dosing, and also a pulmonary appointment tomorrow.\"     Since hospital discharge, pt has followed up with both cardiology and pulmonology. Cardiology note above.   Has additional chest imaging ordered by pulmonology due to residual fluid on lungs    Pt notes his BP has been dropping low and his pulse rate has been up. Does not have heart palpitations/ fluttering- can just tell when he goes to check BP   Very tired   Currently taking lisinopril 20 mg   Due for B12 shot today   Still taking thyroid medication, but feels like he has felt bad since taking. TSH yesterday was well controlled on current dose     Still with bowel issues. 10 loose stools a day. XR of abdomen in may showed mild to moderate stool burden. miralax recommended but patient stopped as he did not feel like it was helping. He would like to pursue a colonoscopy   CT scan of abdomen and pelvis in ER normal.     The following portions of the patient's history were reviewed and updated as appropriate: allergies, current medications, past family history, past medical history, past social history, past surgical history and problem list.    Review of Systems   Constitutional: Positive for fatigue. Negative for chills, diaphoresis and fever.   HENT: Negative.  Negative for congestion, ear discharge, ear pain, hearing loss, nosebleeds, postnasal drip, sinus pressure, sneezing and sore throat.    Eyes: Negative.    Respiratory: Negative.  Negative for cough, chest tightness, " "shortness of breath and wheezing.    Cardiovascular: Negative.  Negative for chest pain, palpitations and leg swelling.   Gastrointestinal: Positive for constipation and diarrhea. Negative for abdominal distention, abdominal pain, blood in stool, nausea and vomiting.   Genitourinary: Negative.  Negative for difficulty urinating, dysuria, flank pain, frequency, hematuria and urgency.   Musculoskeletal: Negative.  Negative for arthralgias, back pain, gait problem, joint swelling, myalgias, neck pain and neck stiffness.   Skin: Negative.  Negative for color change, pallor, rash and wound.   Neurological: Negative for dizziness, syncope, weakness, light-headedness, numbness and headaches.       Objective    Blood pressure 108/62, pulse 108, height 188 cm (74\"), weight 86 kg (189 lb 9.6 oz), SpO2 95 %.     Physical Exam  Vitals and nursing note reviewed.   Constitutional:       Appearance: Normal appearance.   HENT:      Head: Normocephalic.      Right Ear: External ear normal.      Left Ear: External ear normal.      Nose: Nose normal.   Eyes:      Conjunctiva/sclera: Conjunctivae normal.   Cardiovascular:      Rate and Rhythm: Tachycardia present.   Pulmonary:      Effort: Pulmonary effort is normal. No respiratory distress.      Breath sounds: Normal breath sounds. No wheezing or rhonchi.   Abdominal:      Tenderness: There is no abdominal tenderness. There is no guarding.   Skin:     General: Skin is warm and dry.   Neurological:      Mental Status: He is alert and oriented to person, place, and time.   Psychiatric:         Mood and Affect: Mood normal.         Behavior: Behavior normal.         Thought Content: Thought content normal.         Judgment: Judgment normal.         Assessment/Plan   Diagnoses and all orders for this visit:    1. Hospital discharge follow-up (Primary)    2. Essential hypertension  -     nebivolol (Bystolic) 5 MG tablet; Take 1 tablet by mouth Daily.  Dispense: 30 tablet; Refill: 1    3. " Tachycardia  -     nebivolol (Bystolic) 5 MG tablet; Take 1 tablet by mouth Daily.  Dispense: 30 tablet; Refill: 1    4. Change in bowel habits  -     Ambulatory Referral For Screening Colonoscopy  -     Ambulatory Referral to Gastroenterology    5. Pleural effusion on left  - continue with specialty follow up as scheduled     will have patient discontinue lisinopril and do trial of low dose bystolic to help with BP management and tachycardia   Referral to GI and for colonoscopy placed   Continue with thyroid medication   Follow up in 2 weeks for BP check

## 2021-07-12 ENCOUNTER — OFFICE VISIT (OUTPATIENT)
Dept: CARDIOLOGY | Facility: CLINIC | Age: 81
End: 2021-07-12

## 2021-07-12 VITALS
WEIGHT: 191 LBS | OXYGEN SATURATION: 96 % | SYSTOLIC BLOOD PRESSURE: 122 MMHG | BODY MASS INDEX: 24.51 KG/M2 | DIASTOLIC BLOOD PRESSURE: 66 MMHG | HEART RATE: 88 BPM | HEIGHT: 74 IN

## 2021-07-12 DIAGNOSIS — I25.10 CORONARY ARTERY DISEASE INVOLVING NATIVE CORONARY ARTERY OF NATIVE HEART WITHOUT ANGINA PECTORIS: Primary | ICD-10-CM

## 2021-07-12 DIAGNOSIS — R00.0 TACHYCARDIA: ICD-10-CM

## 2021-07-12 DIAGNOSIS — I10 ESSENTIAL HYPERTENSION: ICD-10-CM

## 2021-07-12 PROCEDURE — 99214 OFFICE O/P EST MOD 30 MIN: CPT | Performed by: INTERNAL MEDICINE

## 2021-07-12 NOTE — PROGRESS NOTES
[facility] AT [address], [address]  [place], [place], [address] [phone]  [email or web address]           OUTPATIENT CLINIC FOLLOW UP NOTE    Patient Care Team:  Patient Care Team:  Tory Garcia PA as PCP - General (Physician Assistant)    Subjective:      Chief Complaint   Patient presents with   • Coronary artery disease involving native coronary artery of        HPI:    Palomo Rainey is a 81 y.o. male.  Problem List:  1. CAD   1. NSTEMI 8/2018 S/P TYSON to circumflex  2. Next hyperlipidemia  1. Myalgias with atorvastatin and rosuvastatin  3. Paroxysmal atrial fibrillation, single episode during left ventriculography  4. Hypertension  5. Hypothyroidism  6. History of stroke    7. Parapneumonic effusion 6/2021, status post thoracentesis, etiology not certain  1. BNP normal, echo unremarkable    The patient presents today for follow-up.    Since his hospitalization he has done well.  Denies shortness of air.  Denies chest pain, palpitations.  Had tachycardia that improved with Bystolic    Review of Systems:  Negative for exertional chest pain, dyspnea with exertion, orthopnea, PND, lower extremity edema, palpitations, lightheadedness, syncope.    PFSH:  Patient Active Problem List   Diagnosis   • Coronary artery disease involving native coronary artery of native heart without angina pectoris   • Essential hypertension   • History of CVA (cerebrovascular accident)   • Atrial fibrillation, transient (CMS/HCC)   • Pleural effusion on left   • Hypothyroidism (acquired)         Current Outpatient Medications:   •  aspirin 81 MG EC tablet, Take 81 mg by mouth Daily., Disp: , Rfl:   •  cholecalciferol (VITAMIN D3) 25 MCG (1000 UT) tablet, Take 2,000 Units by mouth Daily., Disp: , Rfl:   •  Cyanocobalamin 1000 MCG/ML kit, Inject  as directed Every 30 (Thirty) Days., Disp: , Rfl:   •  levothyroxine (SYNTHROID, LEVOTHROID) 50 MCG tablet, Take 1 tablet by mouth Daily.,  "Disp: 30 tablet, Rfl: 1  •  nebivolol (Bystolic) 5 MG tablet, Take 1 tablet by mouth Daily., Disp: 30 tablet, Rfl: 1    Current Facility-Administered Medications:   •  cyanocobalamin injection 1,000 mcg, 1,000 mcg, Intramuscular, Q28 Days, Tory Garcia PA, 1,000 mcg at 07/08/21 1239    No Known Allergies     reports that he quit smoking about 36 years ago. His smoking use included cigarettes. He started smoking about 71 years ago. He has a 105.00 pack-year smoking history. He has never used smokeless tobacco.    Family History   Problem Relation Age of Onset   • Dementia Mother    • Thyroid disease Mother    • Stroke Father          Objective:   Blood pressure 122/66, pulse 88, height 188 cm (74\"), weight 86.6 kg (191 lb), SpO2 96 %.  CONSTITUTIONAL: No acute distress, normal affect  RESPIRATORY: Normal effort. Clear to auscultation bilaterally without wheezing or rales  CARDIOVASCULAR: Regular rate and rhythm with normal S1 and S2. Without murmur, gallop or rub.  No carotid bruit bilaterally  PERIPHERAL VASCULAR: Normal radial pulses bilaterally.     Labs:    BUN   Date Value Ref Range Status   07/07/2021 18 8 - 23 mg/dL Final     Creatinine   Date Value Ref Range Status   07/07/2021 1.07 0.76 - 1.27 mg/dL Final     Potassium   Date Value Ref Range Status   07/07/2021 4.5 3.5 - 5.2 mmol/L Final     ALT (SGPT)   Date Value Ref Range Status   06/30/2021 7 1 - 41 U/L Final     AST (SGOT)   Date Value Ref Range Status   06/30/2021 13 1 - 40 U/L Final       Lab Results   Component Value Date    CHOL 169 08/04/2018     Lab Results   Component Value Date    TRIG 81 05/03/2021    TRIG 42 08/04/2018     Lab Results   Component Value Date    HDL 36 (L) 05/03/2021    HDL 37 (L) 08/04/2018     Lab Results   Component Value Date     (H) 05/03/2021     (H) 08/04/2018     Lab Results   Component Value Date    VLDL 15 05/03/2021     No results found for: LDLHDL      Diagnostic Data:    Procedures    30-day " event monitor 8/2018 - no recurrent atrial fibrillation    TTE 8/2018  · Left ventricular systolic function is normal. Estimated EF = 60%.  · All left ventricular wall segments contract normally.  · Left ventricular diastolic function was indeterminate.    [facility] 8/2018  · There was severe 1 vessel coronary artery disease involving a 90% discrete mid circumflex stenosis followed by multiple intermediate lesions extending into the first obtuse marginal branch.  These lesions are status post intervention with a Xience Sydni 2.25 x 38 mm drug-eluting stent with post dilatation of the proximal segment with a 3.0 mm diameter balloon.  The bifurcation of the first and second obtuse marginal branches were then treated with a kissing balloon inflation.  · LVEF 60% with mild hypokinesis of the basal to mid anterolateral segment  · The patient went into atrial fibrillation during the procedure.  Rate controlled with heart rate ranging from 60 80 bpm    Assessment and Plan:     Coronary artery disease involving native coronary artery of native heart without angina pectoris  Tachycardia  Dyslipidemia  -CCS 0  -Myalgias with atorvastatin, rosuvastatin.  Patient reluctant to try another statin   -Continue nebivolol, aspirin    Atrial fibrillation, transient   -Had a transient event during left ventriculography and a negative event monitor thereafter.    Essential hypertension  -Continue current related medications    - Return in about 1 year (around 7/12/2022).    Meliton Cantu MD, MSc, [facility]  Interventional Cardiology  [facility] at [facility]

## 2021-07-17 LAB
QT INTERVAL: 318 MS
QTC INTERVAL: 424 MS

## 2021-07-29 ENCOUNTER — OFFICE VISIT (OUTPATIENT)
Dept: PULMONOLOGY | Facility: CLINIC | Age: 81
End: 2021-07-29

## 2021-07-29 VITALS
HEIGHT: 74 IN | OXYGEN SATURATION: 95 % | WEIGHT: 191 LBS | BODY MASS INDEX: 24.51 KG/M2 | HEART RATE: 76 BPM | TEMPERATURE: 97.1 F | SYSTOLIC BLOOD PRESSURE: 142 MMHG | DIASTOLIC BLOOD PRESSURE: 90 MMHG

## 2021-07-29 DIAGNOSIS — J90 PLEURAL EFFUSION ON LEFT: Primary | ICD-10-CM

## 2021-07-29 PROCEDURE — 99213 OFFICE O/P EST LOW 20 MIN: CPT | Performed by: NURSE PRACTITIONER

## 2021-07-29 NOTE — PROGRESS NOTES
"[facility] Pulmonary Follow up      Chief Complaint  plueral effusion on left    Subjective          Palomo Rainey presents to [facility] for follow-up chest x-ray.  I saw him after recent hospitalization for a left-sided pleural effusion with ultrasound-guided thoracentesis.  On his follow-up he was already doing much better and having very little dyspnea with activity.  His chest x-ray showed no reoccurrence in the left pleural effusion.  He is here today for follow-up chest x-ray.    He continues to do well.  He is having no shortness of breath.  No cough or sputum production.  No pleuritic chest pain.    Unfortunately he has had a bit of GI issues since I last saw him with some constipation.      Objective     Vital Signs:   /90   Pulse 76   Temp 97.1 °F (36.2 °C)   Ht 188 cm (74\")   Wt 86.6 kg (191 lb)   SpO2 95% Comment: resting at room air  BMI 24.52 kg/m²       Immunization History   Administered Date(s) Administered   • COVID-19 (JESSICA) 04/06/2021   • Fluzone High Dose =>65 Years ([employer] ONLY) 09/21/2016   • Pneumococcal Polysaccharide (PPSV23) 10/11/2016       Physical Exam  Vitals reviewed.   Constitutional:       Appearance: He is well-developed.   HENT:      Head: Normocephalic and atraumatic.   Eyes:      Pupils: Pupils are equal, round, and reactive to light.   Cardiovascular:      Rate and Rhythm: Normal rate and regular rhythm.      Heart sounds: No murmur heard.     Pulmonary:      Effort: Pulmonary effort is normal. No respiratory distress.      Breath sounds: Normal breath sounds. No wheezing or rales.   Abdominal:      General: Bowel sounds are normal. There is no distension.      Palpations: Abdomen is soft.   Musculoskeletal:         General: Normal range of motion.      Cervical back: Normal range of motion and neck supple.   Skin:     General: Skin is warm and dry.      Findings: No erythema.   Neurological:      Mental " Status: He is alert and oriented to person, place, and time.   Psychiatric:         Behavior: Behavior normal.          Result Review :              PA and lateral chest x-ray done the office today reviewed by me  Awaiting final MD interpretation                 Assessment and Plan    Problem List Items Addressed This Visit        Pulmonary and Pneumonias    Pleural effusion on left - Primary    Relevant Orders    XR Chest PA & Lateral          We reviewed his chest x-ray today in the office.  There continues to be decreased pleural fluid on the left side but continued atelectasis.  I have encouraged him to continue with his activity and deep breathing exercises.   He is quite active and continues to be a  and works daily.  He has been a little bit less active last couple weeks secondary to his GI issues.    I would like to continue to follow-up with him to assure resolution of this area.  As per her last visit we have discussed follow-up CT scan and he would like to hold off on this time.      I spent 25 minutes caring for Paolmo on this date of service. This time includes time spent by me in the following activities:preparing for the visit, reviewing tests, obtaining and/or reviewing a separately obtained history, performing a medically appropriate examination and/or evaluation , counseling and educating the patient/family/caregiver, ordering medications, tests, or procedures, referring and communicating with other health care professionals , documenting information in the medical record and independently interpreting results and communicating that information with the patient/family/caregiver  Follow Up     Return in about 10 weeks (around 10/7/2021).  Patient was given instructions and counseling regarding his condition or for health maintenance advice. Please see specific information pulled into the AVS if appropriate.     Kelsy Weir APRN, ACNP  [facility], [place], operated by [facility] Pulmonary Critical Care  Medicine  Electronically signed

## 2021-08-05 ENCOUNTER — OFFICE VISIT (OUTPATIENT)
Dept: FAMILY MEDICINE CLINIC | Facility: CLINIC | Age: 81
End: 2021-08-05

## 2021-08-05 VITALS
HEART RATE: 68 BPM | DIASTOLIC BLOOD PRESSURE: 78 MMHG | SYSTOLIC BLOOD PRESSURE: 126 MMHG | OXYGEN SATURATION: 99 % | RESPIRATION RATE: 18 BRPM | HEIGHT: 74 IN | TEMPERATURE: 97 F | BODY MASS INDEX: 23.87 KG/M2 | WEIGHT: 186 LBS

## 2021-08-05 DIAGNOSIS — E03.9 ACQUIRED HYPOTHYROIDISM: ICD-10-CM

## 2021-08-05 DIAGNOSIS — I10 ESSENTIAL HYPERTENSION: Primary | ICD-10-CM

## 2021-08-05 DIAGNOSIS — R53.83 OTHER FATIGUE: ICD-10-CM

## 2021-08-05 PROCEDURE — 99214 OFFICE O/P EST MOD 30 MIN: CPT | Performed by: FAMILY MEDICINE

## 2021-08-05 NOTE — PROGRESS NOTES
Assessment/Plan       Diagnoses and all orders for this visit:    1. Essential hypertension (Primary)  -     CBC & Differential    2. Acquired hypothyroidism    3. Other fatigue  -     CBC & Differential  -     Comprehensive Metabolic Panel           Follow up: Return for follow up depends on review of labs and testing.     DISCUSSION  Recent significant issue with pleural effusion, hypotension and tachycardia.  Successful thoracentesis.  Improving but still having some episodes of fatigue.  Blood pressure has improved.  He believes that a lot of the symptoms started around the start of levothyroxine.  Since TSH was only mildly elevated, we will go ahead and discontinue this and recheck in about 8 weeks.  He will monitor blood pressure and call if increasing again or if heart rate changes.    Due to fatigue, recheck CBC and CMP.        MEDICATIONS PRESCRIBED  Requested Prescriptions      No prescriptions requested or ordered in this encounter                -------------------------------------------    Subjective     Chief Complaint   Patient presents with   • talk about meds     thyroid, bp   • Headache   • Fatigue   • Diarrhea         History of Present Illness    Hypotension  Bp dropped after med change nd increased of thyroid med    HR increased when BP dropped    Now BP is getting better     Occ headache once in awhile. No headache now  Hit and then gone in 30-45 sec    He developed significant pleural effusion and had thoracentesis.  That has greatly improved.  Will be following up with pulmonology.    Diarrhea  After Linzess  Was constipated  Had been on Miralax  Saw GI and added Linzess  Has been taking off and on        Social History     Tobacco Use   Smoking Status Former Smoker   • Packs/day: 3.00   • Years: 35.00   • Pack years: 105.00   • Types: Cigarettes   • Start date:    • Quit date:    • Years since quittin.6   Smokeless Tobacco Never Used          Past Medical History,Medications,  "Allergies, and social history was reviewed.          Review of Systems   Constitutional: Positive for fatigue.   HENT: Negative.    Respiratory: Negative.    Cardiovascular: Negative.    Gastrointestinal: Positive for diarrhea.   Neurological: Negative.    Psychiatric/Behavioral: Negative.        Objective     Vitals:    08/05/21 0934   BP: 126/78   Pulse: 68   Resp: 18   Temp: 97 °F (36.1 °C)   SpO2: 99%   Weight: 84.4 kg (186 lb)   Height: 188 cm (74\")          Physical Exam  Vitals and nursing note reviewed.   Constitutional:       Appearance: He is well-developed.   HENT:      Head: Normocephalic and atraumatic.      Right Ear: External ear normal.      Left Ear: External ear normal.   Eyes:      Pupils: Pupils are equal, round, and reactive to light.   Cardiovascular:      Rate and Rhythm: Normal rate and regular rhythm.      Heart sounds: Normal heart sounds.   Pulmonary:      Effort: Pulmonary effort is normal. No respiratory distress.      Breath sounds: Normal breath sounds. No wheezing or rales.   Musculoskeletal:      Right lower leg: No edema.      Left lower leg: No edema.   Skin:     General: Skin is warm and dry.   Neurological:      Mental Status: He is alert.   Psychiatric:         Behavior: Behavior normal.                     Prosper Bradshaw MD    "

## 2021-08-06 LAB
ALBUMIN SERPL-MCNC: 3.6 G/DL (ref 3.5–5.2)
ALBUMIN/GLOB SERPL: 1 G/DL
ALP SERPL-CCNC: 81 U/L (ref 39–117)
ALT SERPL-CCNC: 11 U/L (ref 1–41)
AST SERPL-CCNC: 17 U/L (ref 1–40)
BASOPHILS # BLD AUTO: 0.05 10*3/MM3 (ref 0–0.2)
BASOPHILS NFR BLD AUTO: 0.7 % (ref 0–1.5)
BILIRUB SERPL-MCNC: 0.2 MG/DL (ref 0–1.2)
BUN SERPL-MCNC: 16 MG/DL (ref 8–23)
BUN/CREAT SERPL: 14.7 (ref 7–25)
CALCIUM SERPL-MCNC: 9.6 MG/DL (ref 8.6–10.5)
CHLORIDE SERPL-SCNC: 103 MMOL/L (ref 98–107)
CO2 SERPL-SCNC: 24.1 MMOL/L (ref 22–29)
CREAT SERPL-MCNC: 1.09 MG/DL (ref 0.76–1.27)
EOSINOPHIL # BLD AUTO: 0.21 10*3/MM3 (ref 0–0.4)
EOSINOPHIL NFR BLD AUTO: 2.8 % (ref 0.3–6.2)
ERYTHROCYTE [DISTWIDTH] IN BLOOD BY AUTOMATED COUNT: 13.2 % (ref 12.3–15.4)
GLOBULIN SER CALC-MCNC: 3.7 GM/DL
GLUCOSE SERPL-MCNC: 67 MG/DL (ref 65–99)
HCT VFR BLD AUTO: 41 % (ref 37.5–51)
HGB BLD-MCNC: 13.5 G/DL (ref 13–17.7)
IMM GRANULOCYTES # BLD AUTO: 0.04 10*3/MM3 (ref 0–0.05)
IMM GRANULOCYTES NFR BLD AUTO: 0.5 % (ref 0–0.5)
LYMPHOCYTES # BLD AUTO: 1.6 10*3/MM3 (ref 0.7–3.1)
LYMPHOCYTES NFR BLD AUTO: 21.2 % (ref 19.6–45.3)
MCH RBC QN AUTO: 27.3 PG (ref 26.6–33)
MCHC RBC AUTO-ENTMCNC: 32.9 G/DL (ref 31.5–35.7)
MCV RBC AUTO: 83 FL (ref 79–97)
MONOCYTES # BLD AUTO: 0.7 10*3/MM3 (ref 0.1–0.9)
MONOCYTES NFR BLD AUTO: 9.3 % (ref 5–12)
NEUTROPHILS # BLD AUTO: 4.96 10*3/MM3 (ref 1.7–7)
NEUTROPHILS NFR BLD AUTO: 65.5 % (ref 42.7–76)
NRBC BLD AUTO-RTO: 0 /100 WBC (ref 0–0.2)
PLATELET # BLD AUTO: 385 10*3/MM3 (ref 140–450)
POTASSIUM SERPL-SCNC: 4.9 MMOL/L (ref 3.5–5.2)
PROT SERPL-MCNC: 7.3 G/DL (ref 6–8.5)
RBC # BLD AUTO: 4.94 10*6/MM3 (ref 4.14–5.8)
SODIUM SERPL-SCNC: 139 MMOL/L (ref 136–145)
WBC # BLD AUTO: 7.56 10*3/MM3 (ref 3.4–10.8)

## 2021-08-26 ENCOUNTER — OFFICE VISIT (OUTPATIENT)
Dept: FAMILY MEDICINE CLINIC | Facility: CLINIC | Age: 81
End: 2021-08-26

## 2021-08-26 VITALS
TEMPERATURE: 98.9 F | SYSTOLIC BLOOD PRESSURE: 130 MMHG | BODY MASS INDEX: 24.26 KG/M2 | RESPIRATION RATE: 24 BRPM | OXYGEN SATURATION: 98 % | DIASTOLIC BLOOD PRESSURE: 80 MMHG | HEIGHT: 74 IN | WEIGHT: 189 LBS | HEART RATE: 107 BPM

## 2021-08-26 DIAGNOSIS — E53.8 VITAMIN B12 DEFICIENCY: ICD-10-CM

## 2021-08-26 DIAGNOSIS — E03.9 ACQUIRED HYPOTHYROIDISM: Primary | ICD-10-CM

## 2021-08-26 DIAGNOSIS — R53.83 OTHER FATIGUE: ICD-10-CM

## 2021-08-26 DIAGNOSIS — I10 ESSENTIAL HYPERTENSION: ICD-10-CM

## 2021-08-26 PROCEDURE — 99214 OFFICE O/P EST MOD 30 MIN: CPT | Performed by: PHYSICIAN ASSISTANT

## 2021-08-26 PROCEDURE — 96372 THER/PROPH/DIAG INJ SC/IM: CPT | Performed by: PHYSICIAN ASSISTANT

## 2021-08-26 RX ORDER — NEBIVOLOL 2.5 MG/1
2.5 TABLET ORAL DAILY
Qty: 30 TABLET | Refills: 1 | Status: SHIPPED | OUTPATIENT
Start: 2021-08-26 | End: 2021-09-20

## 2021-08-26 RX ADMIN — CYANOCOBALAMIN 1000 MCG: 1000 INJECTION, SOLUTION INTRAMUSCULAR; SUBCUTANEOUS at 09:04

## 2021-08-26 NOTE — PROGRESS NOTES
"Chief Complaint  Follow-up, B12 def, Hypothyroidism, and Hypertension    Subjective          Palomo Rainey presents to [facility]  History of Present Illness  Fatigue   Feeling very tired all the time    Hypothyroidism  Stopped his medication because did not feeling any better so he quit taking it.  Due for recheck of labs.    Vit B12  Getting monthly injections  Not taking any OTC B12 supplements  Due for labs    CAD cardiac stent and Afib  Stopped the Bystolic 5 mg because his BP was too low and he did not feel good taking it  Sees Dr Cantu cardiology  June 28, 2021 he was seen in ER for Left lung effusion had the fluid drawn off but it was discarded, not sent for testing, CT chest and abdomen showed no lesions or malignancy. No CHF. Normal ECHO EF 60% normal LV  He is having some sharp pain in left lower lung worse with cough or sneeze; feels like it is inside; says he has mild SOA  He has been seeing the Pulmonary specialist to follow up on this      Objective   Vital Signs:   /80   Pulse 107   Temp 98.9 °F (37.2 °C)   Resp 24   Ht 188 cm (74.02\")   Wt 85.7 kg (189 lb)   SpO2 98%   BMI 24.26 kg/m²     Physical Exam  Vitals and nursing note reviewed.   Constitutional:       General: He is not in acute distress.     Appearance: Normal appearance. He is well-developed and normal weight. He is not ill-appearing.   HENT:      Head: Normocephalic.      Right Ear: External ear normal.      Left Ear: External ear normal.      Nose: Nose normal.   Eyes:      General: Lids are normal.   Cardiovascular:      Rate and Rhythm: Normal rate and regular rhythm.      Heart sounds: Normal heart sounds, S1 normal and S2 normal. No murmur heard.   No friction rub. No gallop.    Pulmonary:      Effort: Pulmonary effort is normal. No respiratory distress.      Breath sounds: Normal breath sounds.   Abdominal:      General: Abdomen is flat.   Musculoskeletal:      Cervical back: Neck " supple.      Right lower leg: No edema.      Left lower leg: No edema.   Lymphadenopathy:      Cervical: No cervical adenopathy.   Skin:     General: Skin is warm and dry.   Neurological:      General: No focal deficit present.      Mental Status: He is alert and oriented to person, place, and time.   Psychiatric:         Mood and Affect: Mood normal.         Speech: Speech normal.         Behavior: Behavior normal.         Thought Content: Thought content normal.         Judgment: Judgment normal.        Result Review :                 Assessment and Plan    Diagnoses and all orders for this visit:    1. Acquired hypothyroidism (Primary)  -     Thyroid Panel With TSH  -     Thyroid Peroxidase Antibody    2. Other fatigue    3. Vitamin B12 deficiency  -     Vitamin B12    4. Essential hypertension  -     nebivolol (Bystolic) 2.5 MG tablet; Take 1 tablet by mouth Daily.  Dispense: 30 tablet; Refill: 1        Follow Up      Will check labs today and notify pt of results  Will restart pt on Bystolic at lower dose 2.5 mg  Will have pt get B12 injection today if still not to goal may want to increase frequency of B12 injections to every 2 weeks  Encouraged pt to take his medications as prescribed   Keep follow up with Pulmonary specialists. Pt agrees

## 2021-08-27 ENCOUNTER — HOSPITAL ENCOUNTER (OUTPATIENT)
Dept: GENERAL RADIOLOGY | Facility: HOSPITAL | Age: 81
Discharge: HOME | End: 2021-08-27
Admitting: NURSE PRACTITIONER

## 2021-08-27 ENCOUNTER — TELEPHONE (OUTPATIENT)
Dept: FAMILY MEDICINE CLINIC | Facility: CLINIC | Age: 81
End: 2021-08-27

## 2021-08-27 DIAGNOSIS — J90 PLEURAL EFFUSION ON LEFT: Primary | ICD-10-CM

## 2021-08-27 DIAGNOSIS — J90 PLEURAL EFFUSION ON LEFT: ICD-10-CM

## 2021-08-27 DIAGNOSIS — R06.02 SHORTNESS OF BREATH: ICD-10-CM

## 2021-08-27 LAB
FT4I SERPL CALC-MCNC: 1.3 (ref 1.2–4.9)
T3RU NFR SERPL: 24 % (ref 24–39)
T4 SERPL-MCNC: 5.4 UG/DL (ref 4.5–12)
THYROPEROXIDASE AB SERPL-ACNC: 27 IU/ML (ref 0–34)
TSH SERPL DL<=0.005 MIU/L-ACNC: 7.45 UIU/ML (ref 0.45–4.5)
VIT B12 SERPL-MCNC: 449 PG/ML (ref 211–946)

## 2021-08-27 PROCEDURE — 71046 X-RAY EXAM CHEST 2 VIEWS: CPT

## 2021-08-27 NOTE — TELEPHONE ENCOUNTER
XR orders place can go to [facility] to get this done. [facility]

## 2021-08-27 NOTE — TELEPHONE ENCOUNTER
Patient daughter called stated patient is short of breath. Stated o2 is 96. Covid test was negative Last night. They are worried about fluid building on his lungs again. Asked if a chest Xray can be order just to make sure. Stated that they don't want him to end up in the hospital again.     Magaly [phone]

## 2021-08-30 ENCOUNTER — TELEPHONE (OUTPATIENT)
Dept: FAMILY MEDICINE CLINIC | Facility: CLINIC | Age: 81
End: 2021-08-30

## 2021-08-30 DIAGNOSIS — R19.4 CHANGE IN BOWEL HABITS: Primary | ICD-10-CM

## 2021-08-30 NOTE — TELEPHONE ENCOUNTER
PT SAW DR SINCLAIR AND THEY DON'T HAVE HIS COLONOSCOPY SCHEDULED UNTIL October  THEY WANT TO KNOW CAN WE REFER HIM TO DR TIFFANY ATKINSON BECAUSE HE DOES HIS IN OFFICE.  SHE STATES PATIENT IS GETTING WORSE AND IT NEEDS TO BE DONE ASAP LIKE THIS WEEK OR NEXT WEEK.

## 2021-08-31 NOTE — TELEPHONE ENCOUNTER
Spoke with patient who said he didn't need this referral and already had a colon set up. I do not see Crystal on the verbal release to talk to her.

## 2021-08-31 NOTE — TELEPHONE ENCOUNTER
CRYSTAL CARE GIVER IS CALLING AND WANTS TO KNOW CAN DR YAÑEZ PLACE THIS REFERRAL THAT THIS NEEDS DONE ASAP.

## 2021-09-07 ENCOUNTER — CLINICAL SUPPORT (OUTPATIENT)
Dept: FAMILY MEDICINE CLINIC | Facility: CLINIC | Age: 81
End: 2021-09-07

## 2021-09-07 DIAGNOSIS — E53.8 B12 DEFICIENCY: ICD-10-CM

## 2021-09-07 PROCEDURE — 96372 THER/PROPH/DIAG INJ SC/IM: CPT | Performed by: PHYSICIAN ASSISTANT

## 2021-09-07 RX ADMIN — CYANOCOBALAMIN 1000 MCG: 1000 INJECTION, SOLUTION INTRAMUSCULAR; SUBCUTANEOUS at 14:59

## 2021-09-13 ENCOUNTER — TELEPHONE (OUTPATIENT)
Dept: FAMILY MEDICINE CLINIC | Facility: CLINIC | Age: 81
End: 2021-09-13

## 2021-09-13 NOTE — TELEPHONE ENCOUNTER
Caller: JANEY    Relationship to patient: Other DAUGHTER    Best call back number:     Chief complaint: FATIGUE, LOW ENERGY, LOSING WEIGHT     Type of visit: OFFICE VISIT    Requested date: AS SOON AS POSSIBLE        Additional notes: PATIENTS DAUGHTER ONLY WANTS PATIENT TO SEE DR YAÑEZ, HOWEVER HE DIDN'T HAVE ANY AVAILABLE UNTIL [id number]; PLEASE CALL TO ADVISE

## 2021-09-20 ENCOUNTER — OFFICE VISIT (OUTPATIENT)
Dept: FAMILY MEDICINE CLINIC | Facility: CLINIC | Age: 81
End: 2021-09-20

## 2021-09-20 VITALS
TEMPERATURE: 97.5 F | HEART RATE: 115 BPM | BODY MASS INDEX: 23.61 KG/M2 | HEIGHT: 74 IN | SYSTOLIC BLOOD PRESSURE: 122 MMHG | DIASTOLIC BLOOD PRESSURE: 78 MMHG | OXYGEN SATURATION: 96 % | WEIGHT: 184 LBS | RESPIRATION RATE: 18 BRPM

## 2021-09-20 DIAGNOSIS — E03.9 ACQUIRED HYPOTHYROIDISM: ICD-10-CM

## 2021-09-20 DIAGNOSIS — R14.2 BELCHING: ICD-10-CM

## 2021-09-20 DIAGNOSIS — J90 PLEURAL EFFUSION ON LEFT: ICD-10-CM

## 2021-09-20 DIAGNOSIS — R53.83 OTHER FATIGUE: Primary | ICD-10-CM

## 2021-09-20 DIAGNOSIS — E53.8 B12 DEFICIENCY: ICD-10-CM

## 2021-09-20 PROCEDURE — 96372 THER/PROPH/DIAG INJ SC/IM: CPT | Performed by: PHYSICIAN ASSISTANT

## 2021-09-20 PROCEDURE — 99214 OFFICE O/P EST MOD 30 MIN: CPT | Performed by: PHYSICIAN ASSISTANT

## 2021-09-20 RX ORDER — LEVOTHYROXINE SODIUM 0.03 MG/1
25 TABLET ORAL DAILY
Qty: 30 TABLET | Refills: 2 | Status: SHIPPED | OUTPATIENT
Start: 2021-09-20 | End: 2022-01-04

## 2021-09-20 RX ADMIN — CYANOCOBALAMIN 1000 MCG: 1000 INJECTION, SOLUTION INTRAMUSCULAR; SUBCUTANEOUS at 12:56

## 2021-09-20 NOTE — PROGRESS NOTES
"Chief Complaint  Fatigue (low energy ) and weighing weight.    Subjective          Palomo Rainey presents to [facility]  History of Present Illness    Has been losing weight  Less eating    No energy to eat    Occ ocugh some    Voice is hoarse and whispery  Hard to hear him on the phone    No chest pain     Pain in the upper abd  And gas and belching    recetn colonoscopy was done    Had significant pleural effusion this year.  No definite cause of the pleural effusion was found.    Has been seeing pulmonology as well.             Tobacco Use: Medium Risk   • Smoking Tobacco Use: Former Smoker   • Smokeless Tobacco Use: Never Used         Review of Systems   Constitutional: Positive for fatigue.   HENT: Positive for voice change.    Respiratory: Negative.    Cardiovascular: Negative.    Gastrointestinal: Negative.         Objective       Vital Signs:   /78   Pulse 115   Temp 97.5 °F (36.4 °C)   Resp 18   Ht 188 cm (74\")   Wt 83.5 kg (184 lb)   SpO2 96%   BMI 23.62 kg/m²     Physical Exam  Vitals and nursing note reviewed.   Constitutional:       Appearance: He is well-developed.   HENT:      Head: Normocephalic and atraumatic.      Right Ear: External ear normal.      Left Ear: External ear normal.   Eyes:      Pupils: Pupils are equal, round, and reactive to light.   Cardiovascular:      Rate and Rhythm: Normal rate and regular rhythm.      Heart sounds: Normal heart sounds.   Pulmonary:      Effort: Pulmonary effort is normal. No respiratory distress.      Breath sounds: Normal breath sounds. Decreased air movement (left base) present. No wheezing or rales.   Abdominal:      General: There is no distension.      Tenderness: There is no abdominal tenderness. There is no guarding or rebound.   Skin:     General: Skin is warm and dry.   Neurological:      Mental Status: He is alert.   Psychiatric:         Behavior: Behavior normal.          Result Review :             "         Assessment and Plan    Diagnoses and all orders for this visit:    1. Other fatigue (Primary)    2. B12 deficiency  -     Cyanocobalamin 1000 MCG/ML kit; One shot monthly  Dispense: 1 kit; Refill: 11    3. Belching  -     H. Pylori Breath Test - Breath, Lung    4. Pleural effusion on left  -     CT Chest Without Contrast; Future    5. Acquired hypothyroidism  -     levothyroxine (Synthroid) 25 MCG tablet; Take 1 tablet by mouth Daily.  Dispense: 30 tablet; Refill: 2          DISCUSSION    Persistent fatigue.  B12 levels have improved so doubt if that is the cause of his persistent fatigue at this point.  He is having belching and some GI issues so we will check H. pylori.    Persistent pleural effusion.  Recommend recheck CT scan of chest.    Hypothyroidism.  He is agreeable to restart the levothyroxine 25 mcg daily.  His TSH did increase to above 7.        Follow Up   Return for follow up depends on review of labs and testing.    Patient was given instructions and counseling regarding his condition or for health maintenance advice. Please see specific information pulled into the AVS if appropriate.       Prosper Bradshaw MD

## 2021-09-21 LAB — UREA BREATH TEST QL: NEGATIVE

## 2021-09-22 ENCOUNTER — TELEPHONE (OUTPATIENT)
Dept: FAMILY MEDICINE CLINIC | Facility: CLINIC | Age: 81
End: 2021-09-22

## 2021-09-22 NOTE — TELEPHONE ENCOUNTER
Caller: HERNANDEZ SOLO    Relationship: CAREGIVER    Best call back number: [phone]    Caller requesting test results: CRYSTAL    What test was performed:  LORA    When was the test performed: [id number]    Where was the test performed: OFFICE    Additional notes: PLEASE CALL CORNELIUS [phone] WITH THE RESULTS

## 2021-09-22 NOTE — TELEPHONE ENCOUNTER
Caller: Cornelius Rainey    Relationship: Self    Best call back number: [phone]    What orders are you requesting (i.e. lab or imaging): CT SCAN    In what timeframe would the patient need to come in: ASAP    Additional notes: CORNELIUS IS GETTING WEAKER

## 2021-09-24 ENCOUNTER — HOSPITAL ENCOUNTER (OUTPATIENT)
Dept: CT IMAGING | Facility: HOSPITAL | Age: 81
Discharge: HOME | End: 2021-09-24
Admitting: FAMILY MEDICINE

## 2021-09-24 DIAGNOSIS — J90 PLEURAL EFFUSION ON LEFT: ICD-10-CM

## 2021-09-24 PROCEDURE — 71250 CT THORAX DX C-: CPT

## 2021-09-27 DIAGNOSIS — R91.8 MASS OF LEFT LUNG: Primary | ICD-10-CM

## 2021-09-27 DIAGNOSIS — R59.0 LYMPHADENOPATHY, MEDIASTINAL: ICD-10-CM

## 2021-09-28 ENCOUNTER — TELEPHONE (OUTPATIENT)
Dept: FAMILY MEDICINE CLINIC | Facility: CLINIC | Age: 81
End: 2021-09-28

## 2021-09-29 ENCOUNTER — TELEPHONE (OUTPATIENT)
Dept: PULMONOLOGY | Facility: CLINIC | Age: 81
End: 2021-09-29

## 2021-09-29 NOTE — TELEPHONE ENCOUNTER
I called Mr Redd regarding his CT of the chest.  He will proceeded with evaluation by CTS for possible decortication of the left pleura.     He would like to follow up with one of our doctors as well to evaluate his CT and ongoing shortness of breath.  I will try to get him rescheduled with a doc next week.

## 2021-10-11 ENCOUNTER — DOCUMENTATION (OUTPATIENT)
Dept: PULMONOLOGY | Facility: CLINIC | Age: 81
End: 2021-10-11

## 2021-10-11 NOTE — PROGRESS NOTES
Chart reviewed.  Notes, CT Scan reviewed.    Agree with Ms. Weir's recommendation that a CT surgical consultation is the next best step.  His left chest is markedly abnormal and most likely will not improve without a decortication.    We will get him scheduled to come to our office to discuss as well.

## 2021-10-14 ENCOUNTER — DOCUMENTATION (OUTPATIENT)
Dept: PULMONOLOGY | Facility: CLINIC | Age: 81
End: 2021-10-14

## 2021-10-14 NOTE — PROGRESS NOTES
Spoke with Magaly Fernandez, patients daughter   I asked  If she knew how to get in touch with Palomo Rainey, that I have made multiple attempts to reach him @ [phone] and  [phone] and also multiple attempts to reach Nigel Redd his contact @ [phone] and [phone]. Magaly Fernandez informed me that Palomo is  seenng  an Oncologist at [facility] and  He ( they) will call [facility] if they ever need us again.  I thanked her for her time and information

## 2021-10-26 NOTE — TELEPHONE ENCOUNTER
"\"They found something that  should have found a long time ago. I'll tell him more when I come in for my B12 in the next day or so.\"     Pt wouldn't elaborate.    "

## 2021-10-26 NOTE — TELEPHONE ENCOUNTER
Please call patient and check on him.  I saw where he was going to see an oncologist at [facility] instead and just want to check and see what they found.

## 2021-10-27 ENCOUNTER — CLINICAL SUPPORT (OUTPATIENT)
Dept: FAMILY MEDICINE CLINIC | Facility: CLINIC | Age: 81
End: 2021-10-27

## 2021-10-27 DIAGNOSIS — E53.8 B12 DEFICIENCY: ICD-10-CM

## 2021-10-27 PROCEDURE — 96372 THER/PROPH/DIAG INJ SC/IM: CPT | Performed by: PHYSICIAN ASSISTANT

## 2021-10-27 RX ADMIN — CYANOCOBALAMIN 1000 MCG: 1000 INJECTION, SOLUTION INTRAMUSCULAR; SUBCUTANEOUS at 11:58

## 2021-11-17 ENCOUNTER — TELEPHONE (OUTPATIENT)
Dept: FAMILY MEDICINE CLINIC | Facility: CLINIC | Age: 81
End: 2021-11-17

## 2021-11-17 NOTE — TELEPHONE ENCOUNTER
Caller: JANEY BHAGAT    Relationship: Child    Best call back number: [phone]    What medication are you requesting: B12 INJECTION    What are your current symptoms:    How long have you been experiencing symptoms:     Have you had these symptoms before:    [x] Yes  [] No    Have you been treated for these symptoms before:   [x] Yes  [] No    If a prescription is needed, what is your preferred pharmacy and phone number: [facility] [address] [place] [address] [phone]  - [phone] FX     Additional notes:CAN'T COME INTO OFFICE AND WAIT FOR SHOT, PLEASE CALL IN TO PHARMACY AND THEY WILL  AND HIS DAUGHTER WHO IS A NURSE WILL ADMINISTER.    CAME INTO OFFICE ON Tuesday 11/16 AND WAITED AN HOUR AND HAD TO LEAVE DUE TO BEING WEAK.

## 2021-11-18 NOTE — TELEPHONE ENCOUNTER
Please call.  Very sorry that they waited that long.      Looks like this has been sent in on September 20, 2021 to [facility] with 11 refills.  Please check with [facility] and [facility] to see if they have that on file.    Let me know if that needs that to be resent.    In addition, find out how he is doing.  I only saw the reports from [facility] that he was going to be having testing done but no reports since then have been sent.

## 2021-11-18 NOTE — TELEPHONE ENCOUNTER
Called spoke with delmy. She stated they did not know that he had refills on them. Also statedhe has been very sick. But that aside he is ok.

## 2021-12-17 ENCOUNTER — TELEPHONE (OUTPATIENT)
Dept: FAMILY MEDICINE CLINIC | Facility: CLINIC | Age: 81
End: 2021-12-17

## 2021-12-17 NOTE — TELEPHONE ENCOUNTER
Please call, can he come see me at 3 pm today. Will work in and can send down to get xray after I check him out.

## 2021-12-17 NOTE — TELEPHONE ENCOUNTER
PT'S WIFE CARE GIVER CALLED IN BECAUSE PT IS HAVING PAIN IN HIS CHEST WHEN HE TAKES A DEEP BREATH. HE IS REFUSING TO GO TO THE URGENT CARE OR TO THE ER. HE HAS HAD BAD EXPERIENCES AT  AND [facility]. HE IS WANTING TO KNOW IF AN XRAY CAN BE ORDERED. I ADVISED I WAS NOT SURE IF ONE WOULD BE ORDERED SINCE HE HAS NOT BEEN SEEN. PLEASE ADVISE.

## 2021-12-20 ENCOUNTER — TELEPHONE (OUTPATIENT)
Dept: FAMILY MEDICINE CLINIC | Facility: CLINIC | Age: 81
End: 2021-12-20

## 2021-12-20 NOTE — TELEPHONE ENCOUNTER
Caller: HERNANDEZ CASTRO    Relationship to patient: Self    Best call back number: [phone]    Chief complaint:     Type of visit: HOSPITAL FOLLOW UP    Requested date: Tuesday OR Wednesday 12/21 OR 12/22    If rescheduling, when is the original appointment:     Additional notes:DAUGHTER SAYS PATIENT WAS TO BE SWITCHED TO DR PATRICIA YAÑEZ AS PCP

## 2021-12-21 NOTE — TELEPHONE ENCOUNTER
Called spoke with deirdre. She stated they have an appointment at 830 in the morning with the oncologist. Stated they don't want the appointment he is to weak and dehydrated.   Dr. Bradshaw informed me if patient felt that way he needed to go back to the Er.   Informed deirdre and she stated they would wait.

## 2021-12-21 NOTE — TELEPHONE ENCOUNTER
Please call, more info please. If he does not need blood work today, I could see at 4:45 today BUT I may not be able to see him til 5 to 5:15. Let me know.

## 2022-01-03 DIAGNOSIS — E03.9 ACQUIRED HYPOTHYROIDISM: ICD-10-CM

## 2022-01-04 RX ORDER — LEVOTHYROXINE SODIUM 0.03 MG/1
25 TABLET ORAL DAILY
Qty: 30 TABLET | Refills: 1 | Status: SHIPPED | OUTPATIENT
Start: 2022-01-04 | End: 2022-03-02

## 2022-03-02 DIAGNOSIS — E03.9 ACQUIRED HYPOTHYROIDISM: ICD-10-CM

## 2022-03-02 RX ORDER — LEVOTHYROXINE SODIUM 0.03 MG/1
25 TABLET ORAL DAILY
Qty: 30 TABLET | Refills: 1 | Status: SHIPPED | OUTPATIENT
Start: 2022-03-02 | End: 2022-07-15

## 2022-03-21 ENCOUNTER — TRANSCRIBE ORDERS (OUTPATIENT)
Dept: ADMINISTRATIVE | Facility: HOSPITAL | Age: 82
End: 2022-03-21

## 2022-03-21 DIAGNOSIS — C45.0 MESOTHELIOMA (PLEURAL): ICD-10-CM

## 2022-03-21 DIAGNOSIS — I26.99 PULMONARY INFARCTION: Primary | ICD-10-CM

## 2022-03-21 DIAGNOSIS — C34.12 PANCOASTS SYNDROME, LEFT: ICD-10-CM

## 2022-04-03 ENCOUNTER — HOSPITAL ENCOUNTER (OUTPATIENT)
Dept: CARDIOLOGY | Facility: HOSPITAL | Age: 82
Discharge: HOME | End: 2022-04-03
Admitting: INTERNAL MEDICINE

## 2022-04-03 VITALS — BODY MASS INDEX: 23.62 KG/M2 | HEIGHT: 74 IN | WEIGHT: 184.08 LBS

## 2022-04-03 DIAGNOSIS — I26.99 PULMONARY INFARCTION: ICD-10-CM

## 2022-04-03 PROCEDURE — 93306 TTE W/DOPPLER COMPLETE: CPT

## 2022-04-03 PROCEDURE — 93306 TTE W/DOPPLER COMPLETE: CPT | Performed by: INTERNAL MEDICINE

## 2022-04-04 LAB
BH CV ECHO MEAS - AO MAX PG (FULL): 1.2 MMHG
BH CV ECHO MEAS - AO MAX PG: 4 MMHG
BH CV ECHO MEAS - AO MEAN PG (FULL): 0.89 MMHG
BH CV ECHO MEAS - AO MEAN PG: 2.4 MMHG
BH CV ECHO MEAS - AO ROOT AREA (BSA CORRECTED): 1.6
BH CV ECHO MEAS - AO ROOT AREA: 9.1 CM^2
BH CV ECHO MEAS - AO ROOT DIAM: 3.4 CM
BH CV ECHO MEAS - AO V2 MAX: 99 CM/SEC
BH CV ECHO MEAS - AO V2 MEAN: 72.9 CM/SEC
BH CV ECHO MEAS - AO V2 VTI: 21.6 CM
BH CV ECHO MEAS - ASC AORTA: 3.5 CM
BH CV ECHO MEAS - AVA(I,A): 2.5 CM^2
BH CV ECHO MEAS - AVA(I,D): 2.5 CM^2
BH CV ECHO MEAS - AVA(V,A): 2.7 CM^2
BH CV ECHO MEAS - AVA(V,D): 2.7 CM^2
BH CV ECHO MEAS - BSA(HAYCOCK): 2.1 M^2
BH CV ECHO MEAS - BSA: 2.1 M^2
BH CV ECHO MEAS - BZI_BMI: 23.6 KILOGRAMS/M^2
BH CV ECHO MEAS - BZI_METRIC_HEIGHT: 188 CM
BH CV ECHO MEAS - BZI_METRIC_WEIGHT: 83.5 KG
BH CV ECHO MEAS - EDV(CUBED): 94.4 ML
BH CV ECHO MEAS - EDV(MOD-SP2): 76.9 ML
BH CV ECHO MEAS - EDV(MOD-SP4): 66.7 ML
BH CV ECHO MEAS - EDV(TEICH): 95 ML
BH CV ECHO MEAS - EF(CUBED): 71.6 %
BH CV ECHO MEAS - EF(MOD-BP): 60.5 %
BH CV ECHO MEAS - EF(MOD-SP2): 62.5 %
BH CV ECHO MEAS - EF(MOD-SP4): 59.5 %
BH CV ECHO MEAS - EF(TEICH): 63.4 %
BH CV ECHO MEAS - ESV(CUBED): 26.8 ML
BH CV ECHO MEAS - ESV(MOD-SP2): 28.8 ML
BH CV ECHO MEAS - ESV(MOD-SP4): 27 ML
BH CV ECHO MEAS - ESV(TEICH): 34.8 ML
BH CV ECHO MEAS - FS: 34.3 %
BH CV ECHO MEAS - IVS/LVPW: 0.96
BH CV ECHO MEAS - IVSD: 0.9 CM
BH CV ECHO MEAS - LA DIMENSION: 3 CM
BH CV ECHO MEAS - LA/AO: 0.89
BH CV ECHO MEAS - LAD MAJOR: 4.3 CM
BH CV ECHO MEAS - LAT PEAK E' VEL: 7.3 CM/SEC
BH CV ECHO MEAS - LATERAL E/E' RATIO: 10.3
BH CV ECHO MEAS - LV DIASTOLIC VOL/BSA (35-75): 31.8 ML/M^2
BH CV ECHO MEAS - LV IVRT: 0.09 SEC
BH CV ECHO MEAS - LV MASS(C)D: 138.1 GRAMS
BH CV ECHO MEAS - LV MASS(C)DI: 65.8 GRAMS/M^2
BH CV ECHO MEAS - LV MAX PG: 2.8 MMHG
BH CV ECHO MEAS - LV MEAN PG: 1.5 MMHG
BH CV ECHO MEAS - LV SYSTOLIC VOL/BSA (12-30): 12.9 ML/M^2
BH CV ECHO MEAS - LV V1 MAX: 83.2 CM/SEC
BH CV ECHO MEAS - LV V1 MEAN: 56.9 CM/SEC
BH CV ECHO MEAS - LV V1 VTI: 17 CM
BH CV ECHO MEAS - LVIDD: 4.6 CM
BH CV ECHO MEAS - LVIDS: 3 CM
BH CV ECHO MEAS - LVLD AP2: 7.3 CM
BH CV ECHO MEAS - LVLD AP4: 6.9 CM
BH CV ECHO MEAS - LVLS AP2: 6.4 CM
BH CV ECHO MEAS - LVLS AP4: 5.8 CM
BH CV ECHO MEAS - LVOT AREA (M): 3.1 CM^2
BH CV ECHO MEAS - LVOT AREA: 3.2 CM^2
BH CV ECHO MEAS - LVOT DIAM: 2 CM
BH CV ECHO MEAS - LVPWD: 0.93 CM
BH CV ECHO MEAS - MED PEAK E' VEL: 7.2 CM/SEC
BH CV ECHO MEAS - MEDIAL E/E' RATIO: 10.5
BH CV ECHO MEAS - MV A MAX VEL: 83.2 CM/SEC
BH CV ECHO MEAS - MV DEC SLOPE: 514 CM/SEC^2
BH CV ECHO MEAS - MV DEC TIME: 0.18 SEC
BH CV ECHO MEAS - MV E MAX VEL: 75.3 CM/SEC
BH CV ECHO MEAS - MV E/A: 0.9
BH CV ECHO MEAS - MV MAX PG: 2.7 MMHG
BH CV ECHO MEAS - MV MEAN PG: 1.8 MMHG
BH CV ECHO MEAS - MV P1/2T MAX VEL: 89 CM/SEC
BH CV ECHO MEAS - MV P1/2T: 50.7 MSEC
BH CV ECHO MEAS - MV V2 MAX: 82.5 CM/SEC
BH CV ECHO MEAS - MV V2 MEAN: 65.4 CM/SEC
BH CV ECHO MEAS - MV V2 VTI: 22.6 CM
BH CV ECHO MEAS - MVA P1/2T LCG: 2.5 CM^2
BH CV ECHO MEAS - MVA(P1/2T): 4.3 CM^2
BH CV ECHO MEAS - MVA(VTI): 2.4 CM^2
BH CV ECHO MEAS - PA ACC TIME: 0.11 SEC
BH CV ECHO MEAS - PA PR(ACCEL): 29.9 MMHG
BH CV ECHO MEAS - RAP SYSTOLE: 3 MMHG
BH CV ECHO MEAS - RVSP: 17 MMHG
BH CV ECHO MEAS - SI(AO): 93.8 ML/M^2
BH CV ECHO MEAS - SI(CUBED): 32.2 ML/M^2
BH CV ECHO MEAS - SI(LVOT): 25.9 ML/M^2
BH CV ECHO MEAS - SI(MOD-SP2): 22.9 ML/M^2
BH CV ECHO MEAS - SI(MOD-SP4): 18.9 ML/M^2
BH CV ECHO MEAS - SI(TEICH): 28.7 ML/M^2
BH CV ECHO MEAS - SV(AO): 196.8 ML
BH CV ECHO MEAS - SV(CUBED): 67.6 ML
BH CV ECHO MEAS - SV(LVOT): 54.4 ML
BH CV ECHO MEAS - SV(MOD-SP2): 48.1 ML
BH CV ECHO MEAS - SV(MOD-SP4): 39.7 ML
BH CV ECHO MEAS - SV(TEICH): 60.3 ML
BH CV ECHO MEAS - TAPSE (>1.6): 2 CM
BH CV ECHO MEAS - TR MAX PG: 14 MMHG
BH CV ECHO MEAS - TR MAX VEL: 189.4 CM/SEC
BH CV ECHO MEASUREMENTS AVERAGE E/E' RATIO: 10.39
BH CV VAS BP RIGHT ARM: NORMAL MMHG
BH CV XLRA - RV BASE: 3.6 CM
BH CV XLRA - RV LENGTH: 5.8 CM
BH CV XLRA - RV MID: 2.6 CM
BH CV XLRA - TDI S': 11.9 CM/SEC
LEFT ATRIUM VOLUME INDEX: 18.4 ML/M^2
LEFT ATRIUM VOLUME: 38.5 ML
MAXIMAL PREDICTED HEART RATE: 138 BPM
STRESS TARGET HR: 117 BPM

## 2022-05-03 ENCOUNTER — TELEPHONE (OUTPATIENT)
Dept: FAMILY MEDICINE CLINIC | Facility: CLINIC | Age: 82
End: 2022-05-03

## 2022-05-03 NOTE — TELEPHONE ENCOUNTER
Caller: EMG SCREENING     Relationship:     Best call back number: [phone]    What form or medical record are you requesting: REQUISITION FORM     Who is requesting this form or medical record from you: EM[facility] SCREENING     How would you like to receive the form or medical records (pick-up, mail, fax): FAX   If fax, what is the fax number: [phone]  If mail, what is the address:   If pick-up, provide patient with address and location details    Timeframe paperwork needed: ASAP     Additional notes: A FAX WAS SENT ON 4/29/22 FOR A PRIMARY IMMUNO DEFICIENCY SCREENING REQUISITION FROM THAT WILL NEED TO BE SIGNED AND FAXED BACK. ANTALIE IS REQUESTING THIS BE FAXED BACK WITH AN OFFICE COVER SHEET WITH ATTENTION TO NATALIE.

## 2022-05-03 NOTE — TELEPHONE ENCOUNTER
Please call and let this caller know that I have been out of town and will be reviewing paperwork tomorrow and we will see if it is in there to send back.

## 2022-05-04 NOTE — TELEPHONE ENCOUNTER
Please call patient, actually, we received a request to order a primary immunodeficiency screening test.  Did he request this?

## 2022-05-05 NOTE — TELEPHONE ENCOUNTER
Called patient stated that someone called and talked to him and asked question but he did not request any testing.

## 2022-07-11 ENCOUNTER — TELEPHONE (OUTPATIENT)
Dept: FAMILY MEDICINE CLINIC | Facility: CLINIC | Age: 82
End: 2022-07-11

## 2022-07-15 ENCOUNTER — OFFICE VISIT (OUTPATIENT)
Dept: FAMILY MEDICINE CLINIC | Facility: CLINIC | Age: 82
End: 2022-07-15

## 2022-07-15 VITALS
TEMPERATURE: 97.1 F | BODY MASS INDEX: 22.46 KG/M2 | OXYGEN SATURATION: 98 % | HEART RATE: 72 BPM | SYSTOLIC BLOOD PRESSURE: 102 MMHG | RESPIRATION RATE: 14 BRPM | DIASTOLIC BLOOD PRESSURE: 60 MMHG | WEIGHT: 175 LBS | HEIGHT: 74 IN

## 2022-07-15 DIAGNOSIS — R60.0 LOWER EXTREMITY EDEMA: ICD-10-CM

## 2022-07-15 DIAGNOSIS — L03.116 LEFT LEG CELLULITIS: ICD-10-CM

## 2022-07-15 DIAGNOSIS — L97.321 SKIN ULCER OF LEFT ANKLE, LIMITED TO BREAKDOWN OF SKIN: Primary | ICD-10-CM

## 2022-07-15 DIAGNOSIS — I73.9 PVD (PERIPHERAL VASCULAR DISEASE): ICD-10-CM

## 2022-07-15 PROCEDURE — 99214 OFFICE O/P EST MOD 30 MIN: CPT | Performed by: FAMILY MEDICINE

## 2022-07-15 RX ORDER — FERROUS SULFATE 325(65) MG
325 TABLET ORAL
COMMUNITY

## 2022-07-15 RX ORDER — DOXYCYCLINE 100 MG/1
100 CAPSULE ORAL EVERY 12 HOURS SCHEDULED
Qty: 20 CAPSULE | Refills: 0 | Status: SHIPPED | OUTPATIENT
Start: 2022-07-15 | End: 2022-07-25

## 2022-07-15 RX ORDER — LEVOTHYROXINE SODIUM 100 UG/1
100 TABLET ORAL DAILY
COMMUNITY
Start: 2022-07-09 | End: 2022-08-03

## 2022-07-15 RX ORDER — HYDROCHLOROTHIAZIDE 25 MG/1
25 TABLET ORAL EVERY OTHER DAY
COMMUNITY

## 2022-07-15 RX ORDER — L.RHAMNOSUS/B.ANIMALIS(LACTIS) 3B CELL
CAPSULE ORAL
COMMUNITY

## 2022-07-15 RX ORDER — TAMSULOSIN HYDROCHLORIDE 0.4 MG/1
1 CAPSULE ORAL DAILY
COMMUNITY
Start: 2022-06-21

## 2022-07-15 RX ORDER — APIXABAN 5 MG/1
TABLET, FILM COATED ORAL
COMMUNITY
Start: 2022-07-11

## 2022-07-15 NOTE — PROGRESS NOTES
"Chief Complaint  L ankle red/swollen  (X 2wks )    Subjective          Palomo Rainey presents to [facility]  History of Present Illness  2 weeks ago, developed open wounds on left lateral ankle. Denies injury. He has similar presentation on right lateral ankle, but no open wound  Since yesterday, wound size progressed and draining, along with increased erythema.   Blood clot in LLE 8-9 months ago, along with PE. He is prescribed Eliquis per his oncologist, but holding currently due to upcoming surgery next, carpal tunnel release and cubital tunnel release with Dr. Flor.     The following portions of the patient's history were reviewed and updated as appropriate: allergies, current medications, past family history, past medical history, past social history, past surgical history and problem list.    Objective   Vital Signs:   /60   Pulse 72   Temp 97.1 °F (36.2 °C)   Resp 14   Ht 188 cm (74\")   Wt 79.4 kg (175 lb)   SpO2 98%   BMI 22.47 kg/m²     Physical Exam  Vitals and nursing note reviewed.   Constitutional:       General: He is not in acute distress.     Appearance: He is well-developed.   HENT:      Head: Normocephalic and atraumatic.      Right Ear: External ear normal.      Left Ear: External ear normal.   Eyes:      Conjunctiva/sclera: Conjunctivae normal.   Pulmonary:      Effort: Pulmonary effort is normal. No respiratory distress.   Musculoskeletal:      Right lower leg: Edema (2+) present.      Left lower leg: Edema (1+ foot and ankle) present.   Skin:     Comments: Left lateral ankle ulceration with surrounding erythema- see picture. Feet are warm to touch   Neurological:      Mental Status: He is alert and oriented to person, place, and time.   Psychiatric:         Behavior: Behavior normal.                  Result Review :                 Assessment and Plan    Diagnoses and all orders for this visit:    1. Skin ulcer of left ankle, limited to " breakdown of skin (HCC) (Primary)  -     Duplex Venous Lower Extremity - Bilateral CAR  -     Duplex Lower Extremity Art / Grafts - Bilateral CAR  -     Ambulatory Referral to Vascular Surgery    2. Lower extremity edema  -     Duplex Venous Lower Extremity - Bilateral CAR  -     Duplex Lower Extremity Art / Grafts - Bilateral CAR  -     Ambulatory Referral to Vascular Surgery    3. Left leg cellulitis  -     doxycycline (MONODOX) 100 MG capsule; Take 1 capsule by mouth Every 12 (Twelve) Hours for 10 days.  Dispense: 20 capsule; Refill: 0  -     Ambulatory Referral to Vascular Surgery    4. PVD (peripheral vascular disease) (HCC)  -     Ambulatory Referral to Vascular Surgery    Verbal result +DVT RLE. He will resume Eliquis 5 mg BID.   Dr. Flor' office is aware of situation and surgery scheduled next week is cancelled.   Arterial doppler to check vascular status    Addendum  Monophasic waveforms below the left knee suggest at least mild peripheral vascular disease.  Patient is aware of this result and I will place a referral to vascular surgeon, along with close follow-up with him next week to monitor.  He is aware to go to the ER if symptoms worsen any.    Follow Up   Return in about 3 days (around 7/18/2022) for Recheck.  Patient was given instructions and counseling regarding his condition or for health maintenance advice. Please see specific information pulled into the AVS if appropriate.

## 2022-07-20 ENCOUNTER — OFFICE VISIT (OUTPATIENT)
Dept: FAMILY MEDICINE CLINIC | Facility: CLINIC | Age: 82
End: 2022-07-20

## 2022-07-20 VITALS
WEIGHT: 175 LBS | HEIGHT: 74 IN | SYSTOLIC BLOOD PRESSURE: 102 MMHG | BODY MASS INDEX: 22.46 KG/M2 | RESPIRATION RATE: 16 BRPM | TEMPERATURE: 97.4 F | OXYGEN SATURATION: 97 % | DIASTOLIC BLOOD PRESSURE: 62 MMHG | HEART RATE: 88 BPM

## 2022-07-20 DIAGNOSIS — I82.439 ACUTE DEEP VEIN THROMBOSIS (DVT) OF POPLITEAL VEIN, UNSPECIFIED LATERALITY: Primary | ICD-10-CM

## 2022-07-20 DIAGNOSIS — R60.0 LOWER EXTREMITY EDEMA: ICD-10-CM

## 2022-07-20 DIAGNOSIS — L03.116 LEFT LEG CELLULITIS: ICD-10-CM

## 2022-07-20 DIAGNOSIS — L97.321 SKIN ULCER OF LEFT ANKLE, LIMITED TO BREAKDOWN OF SKIN: ICD-10-CM

## 2022-07-20 PROCEDURE — 99213 OFFICE O/P EST LOW 20 MIN: CPT | Performed by: FAMILY MEDICINE

## 2022-07-20 RX ORDER — DOXYCYCLINE HYCLATE 100 MG/1
CAPSULE ORAL
COMMUNITY
Start: 2022-07-15 | End: 2022-07-20

## 2022-07-20 NOTE — PROGRESS NOTES
"Chief Complaint  F/u blood clot-L leg     Subjective          Palomo Rainey presents to [facility]  History of Present Illness  Follow up left lower extremity wound and DVT RLE (however, he reports that during venous duplex, the DVT was seen on the left leg).   He has resumed Eliquis  Taking Doxycycline, which has improved the redness surrounding the wound. He is applying triple antibiotic ointment at night, keeping wound covered and dry.  He has been referred to vascular surgeon, too, waiting to hear about appointment     The following portions of the patient's history were reviewed and updated as appropriate: allergies, current medications, past family history, past medical history, past social history, past surgical history and problem list.    Objective   Vital Signs:   /62   Pulse 88   Temp 97.4 °F (36.3 °C)   Resp 16   Ht 188 cm (74\")   Wt 79.4 kg (175 lb)   SpO2 97%   BMI 22.47 kg/m²     Physical Exam  Vitals and nursing note reviewed.   Constitutional:       General: He is not in acute distress.     Appearance: He is well-developed.   HENT:      Head: Normocephalic and atraumatic.      Right Ear: External ear normal.      Left Ear: External ear normal.   Eyes:      Conjunctiva/sclera: Conjunctivae normal.   Pulmonary:      Effort: Pulmonary effort is normal. No respiratory distress.   Musculoskeletal:      Right lower leg: No edema.      Left lower leg: Edema (1+ foot and ankle) present.   Skin:     Comments: Left lateral ankle ulceration with surrounding erythema- see picture. Erythema has decreased and granulation tissue is present    Neurological:      Mental Status: He is alert and oriented to person, place, and time.   Psychiatric:         Behavior: Behavior normal.                Result Review :                 Assessment and Plan    Diagnoses and all orders for this visit:    1. Acute deep vein thrombosis (DVT) of popliteal vein, unspecified laterality " ([facility]) (Primary)  Comments:  He has resumed Eliquis    2. Skin ulcer of left ankle, limited to breakdown of skin ([facility])  Comments:  Improving with doxycycline.  He has an appointment with vascular surgeon tomorrow due to abnormal arterial Doppler lower extremity    3. Lower extremity edema    4. Left leg cellulitis  Comments:  Complete doxycycline        Follow Up   Return in about 2 weeks (around 8/3/2022) for Recheck wound.  Patient was given instructions and counseling regarding his condition or for health maintenance advice. Please see specific information pulled into the AVS if appropriate.

## 2022-08-03 ENCOUNTER — OFFICE VISIT (OUTPATIENT)
Dept: FAMILY MEDICINE CLINIC | Facility: CLINIC | Age: 82
End: 2022-08-03

## 2022-08-03 VITALS
OXYGEN SATURATION: 99 % | BODY MASS INDEX: 22.07 KG/M2 | RESPIRATION RATE: 16 BRPM | HEIGHT: 74 IN | SYSTOLIC BLOOD PRESSURE: 102 MMHG | TEMPERATURE: 97.8 F | WEIGHT: 172 LBS | DIASTOLIC BLOOD PRESSURE: 68 MMHG | HEART RATE: 92 BPM

## 2022-08-03 DIAGNOSIS — L97.321 SKIN ULCER OF LEFT ANKLE, LIMITED TO BREAKDOWN OF SKIN: Primary | ICD-10-CM

## 2022-08-03 PROCEDURE — 99213 OFFICE O/P EST LOW 20 MIN: CPT | Performed by: FAMILY MEDICINE

## 2022-08-03 RX ORDER — NEOMYCIN SULFATE, POLYMYXIN B SULFATE AND DEXAMETHASONE 3.5; 10000; 1 MG/ML; [USP'U]/ML; MG/ML
SUSPENSION/ DROPS OPHTHALMIC
COMMUNITY
Start: 2022-07-29

## 2022-08-03 RX ORDER — CEPHALEXIN 500 MG/1
500 CAPSULE ORAL 2 TIMES DAILY
Qty: 14 CAPSULE | Refills: 0 | Status: SHIPPED | OUTPATIENT
Start: 2022-08-03 | End: 2022-08-10

## 2022-08-03 RX ORDER — LEVOTHYROXINE SODIUM 0.12 MG/1
125 TABLET ORAL DAILY
COMMUNITY
Start: 2022-07-26 | End: 2022-12-01

## 2022-08-03 RX ORDER — MONTELUKAST SODIUM 4 MG/1
2 TABLET, CHEWABLE ORAL 2 TIMES DAILY
COMMUNITY
Start: 2022-07-22

## 2022-08-03 RX ORDER — MORPHINE 10 MG/ML
TINCTURE ORAL
COMMUNITY
Start: 2022-07-25 | End: 2022-08-03

## 2022-08-03 NOTE — PROGRESS NOTES
"Chief Complaint  2wk f/u DVT-L leg     Subjective          Palomo Rainey presents to [facility]  History of Present Illness  Follow up DVT left leg and skin ulcer   He did see vascular specialist, states that they repeated imaging on LLE and RLE, DVT has resolved. I don't have the results from updated imaging. He also says that vascular specialist told him that he didn't need to take Eliquis lifelong, instead just 3 months.   Edema has been improvement with compression socks provided by vascular specialist.     The following portions of the patient's history were reviewed and updated as appropriate: allergies, current medications, past family history, past medical history, past social history, past surgical history and problem list.    Objective   Vital Signs:   /68   Pulse 92   Temp 97.8 °F (36.6 °C)   Resp 16   Ht 188 cm (74\")   Wt 78 kg (172 lb)   SpO2 99%   BMI 22.08 kg/m²     Physical Exam  Vitals and nursing note reviewed.   Constitutional:       General: He is not in acute distress.     Appearance: He is well-developed.   HENT:      Head: Normocephalic and atraumatic.      Right Ear: External ear normal.      Left Ear: External ear normal.   Eyes:      Conjunctiva/sclera: Conjunctivae normal.   Pulmonary:      Effort: Pulmonary effort is normal. No respiratory distress.   Musculoskeletal:         General: Swelling (1+ b/l lower extremities ) present.   Skin:     Comments: Left lateral ankle: wound, healing, granulation tissue is present. Erythema resolved    Neurological:      Mental Status: He is alert and oriented to person, place, and time.   Psychiatric:         Behavior: Behavior normal.            Result Review :                 Assessment and Plan    Diagnoses and all orders for this visit:    1. Skin ulcer of left ankle, limited to breakdown of skin (HCC) (Primary)  -     cephalexin (Keflex) 500 MG capsule; Take 1 capsule by mouth 2 (Two) Times a Day " for 7 days.  Dispense: 14 capsule; Refill: 0    Wound continues to improve. Will extend abx treatment x 1 week. If wound doesn't heal, he will notify me and will refer to wound care at [facility].       Follow Up   Return if symptoms worsen or fail to improve.  Patient was given instructions and counseling regarding his condition or for health maintenance advice. Please see specific information pulled into the AVS if appropriate.

## 2022-08-04 ENCOUNTER — TELEPHONE (OUTPATIENT)
Dept: FAMILY MEDICINE CLINIC | Facility: CLINIC | Age: 82
End: 2022-08-04

## 2022-08-04 NOTE — TELEPHONE ENCOUNTER
Contacted [facility] [phone]   x [phone] medical records and left detailed vm asking for updated US.

## 2022-08-16 ENCOUNTER — TELEPHONE (OUTPATIENT)
Dept: FAMILY MEDICINE CLINIC | Facility: CLINIC | Age: 82
End: 2022-08-16

## 2022-08-16 DIAGNOSIS — L97.321 SKIN ULCER OF LEFT ANKLE, LIMITED TO BREAKDOWN OF SKIN: Primary | ICD-10-CM

## 2022-08-16 NOTE — TELEPHONE ENCOUNTER
Sherita, pt's daughter in law, called stating they were to call for a wound care referral if the cellulitis did not get better. Pt does need this referral placed.    Auocgup-[phone]

## 2022-08-24 ENCOUNTER — OFFICE VISIT (OUTPATIENT)
Dept: FAMILY MEDICINE CLINIC | Facility: CLINIC | Age: 82
End: 2022-08-24

## 2022-08-24 VITALS
HEART RATE: 79 BPM | SYSTOLIC BLOOD PRESSURE: 118 MMHG | BODY MASS INDEX: 22.33 KG/M2 | DIASTOLIC BLOOD PRESSURE: 70 MMHG | HEIGHT: 74 IN | RESPIRATION RATE: 16 BRPM | OXYGEN SATURATION: 99 % | WEIGHT: 174 LBS | TEMPERATURE: 98 F

## 2022-08-24 DIAGNOSIS — L97.321 SKIN ULCER OF LEFT ANKLE, LIMITED TO BREAKDOWN OF SKIN: ICD-10-CM

## 2022-08-24 DIAGNOSIS — M79.672 LEFT FOOT PAIN: Primary | ICD-10-CM

## 2022-08-24 PROCEDURE — 99214 OFFICE O/P EST MOD 30 MIN: CPT | Performed by: FAMILY MEDICINE

## 2022-08-24 RX ORDER — HYDROCODONE BITARTRATE AND ACETAMINOPHEN 5; 325 MG/1; MG/1
1 TABLET ORAL EVERY 6 HOURS PRN
Qty: 10 TABLET | Refills: 0 | Status: SHIPPED | OUTPATIENT
Start: 2022-08-24

## 2022-08-24 NOTE — PROGRESS NOTES
"Chief Complaint  Ulcer on feet (Pain on left foot is really bad with open sores/Loss of blood from sores, asking for a blood transfusion./)    Subjective          Palomo Rainey presents to [facility]  History of Present Illness  Pain in left foot, edema in both feet, ulcers -- both developed \"after being 1.5 pint low on blood\". His oncologist checks labs every 3 weeks, which is how he knows hemoglobin is low. He thinks that he needs a blood transfusion and mentioned this to his oncologist. He was advised that transfusion are not indicated for his current level.   He is seeing wound care at [facility], has had 2 visits for treatment of ulcer left ankle. Doing well with treatment, but has had increased pain due to debridement of wound.  Left foot pain, states that b12 is checked with his routine labs. No history of diabetes, but I do not have recent labs from his oncologist to review.   He does have chronic lumbar pain with radiculopathy. Denies radiation of pain from back to feet at this time. He did have to take an old prescription of Norco to treat feet pain.     The following portions of the patient's history were reviewed and updated as appropriate: allergies, current medications, past family history, past medical history, past social history, past surgical history and problem list.    Objective   Vital Signs:   /70   Pulse 79   Temp 98 °F (36.7 °C)   Resp 16   Ht 188 cm (74\")   Wt 78.9 kg (174 lb)   SpO2 99%   BMI 22.34 kg/m²     Physical Exam  Vitals and nursing note reviewed.   Constitutional:       General: He is not in acute distress.     Appearance: He is well-developed.   HENT:      Head: Normocephalic and atraumatic.      Right Ear: External ear normal.      Left Ear: External ear normal.   Eyes:      Conjunctiva/sclera: Conjunctivae normal.   Cardiovascular:      Rate and Rhythm: Normal rate and regular rhythm.   Pulmonary:      Effort: Pulmonary effort is " normal.      Breath sounds: Normal breath sounds.   Musculoskeletal:         General: No deformity.   Neurological:      Mental Status: He is alert and oriented to person, place, and time.   Psychiatric:         Behavior: Behavior normal.        Result Review :                 Assessment and Plan    Diagnoses and all orders for this visit:    1. Left foot pain (Primary)  -     HYDROcodone-acetaminophen (Norco) 5-325 MG per tablet; Take 1 tablet by mouth Every 6 (Six) Hours As Needed for Moderate Pain .  Dispense: 10 tablet; Refill: 0    2. Skin ulcer of left ankle, limited to breakdown of skin (HCC)  -     HYDROcodone-acetaminophen (Norco) 5-325 MG per tablet; Take 1 tablet by mouth Every 6 (Six) Hours As Needed for Moderate Pain .  Dispense: 10 tablet; Refill: 0    I think pain in foot is secondary to wound care treatments. Will provide temporary pain relief.   Will try to get most recent lab results from his oncologist.       Follow Up   No follow-ups on file.  Patient was given instructions and counseling regarding his condition or for health maintenance advice. Please see specific information pulled into the AVS if appropriate.

## 2022-09-01 ENCOUNTER — APPOINTMENT (OUTPATIENT)
Dept: PHYSICAL THERAPY | Facility: HOSPITAL | Age: 82
End: 2022-09-01

## 2022-09-09 ENCOUNTER — TELEPHONE (OUTPATIENT)
Dept: FAMILY MEDICINE CLINIC | Facility: CLINIC | Age: 82
End: 2022-09-09

## 2022-09-09 NOTE — TELEPHONE ENCOUNTER
DELETE AFTER REVIEWING: Telephone encounter to be sent to the clinical pool     Caller: HERNANDEZ     Relationship: WIFE'S CAREGIVER    Best call back number:  [phone]    What specialty or service is being requested:     INFECTIOUS DISEASE    ny additional details:  BOTH LEGS HAVE WOUNDS.  WOUNDS NOT GOING AWAY.

## 2022-09-12 ENCOUNTER — OFFICE VISIT (OUTPATIENT)
Dept: FAMILY MEDICINE CLINIC | Facility: CLINIC | Age: 82
End: 2022-09-12

## 2022-09-12 VITALS
WEIGHT: 172.8 LBS | DIASTOLIC BLOOD PRESSURE: 60 MMHG | RESPIRATION RATE: 20 BRPM | TEMPERATURE: 97.7 F | HEIGHT: 74 IN | SYSTOLIC BLOOD PRESSURE: 110 MMHG | OXYGEN SATURATION: 98 % | HEART RATE: 97 BPM | BODY MASS INDEX: 22.18 KG/M2

## 2022-09-12 DIAGNOSIS — R60.0 BILATERAL LOWER EXTREMITY EDEMA: Primary | ICD-10-CM

## 2022-09-12 DIAGNOSIS — C45.0 MESOTHELIOMA (PLEURAL): ICD-10-CM

## 2022-09-12 DIAGNOSIS — T88.7XXA SIDE EFFECT OF MEDICATION: ICD-10-CM

## 2022-09-12 PROCEDURE — 99215 OFFICE O/P EST HI 40 MIN: CPT | Performed by: FAMILY MEDICINE

## 2022-09-12 RX ORDER — MORPHINE 10 MG/ML
TINCTURE ORAL
COMMUNITY
Start: 2022-08-22

## 2022-09-12 RX ORDER — FUROSEMIDE 20 MG/1
20 TABLET ORAL DAILY
Qty: 30 TABLET | Refills: 0 | Status: SHIPPED | OUTPATIENT
Start: 2022-09-12

## 2022-09-12 RX ORDER — DOXYCYCLINE 100 MG/1
CAPSULE ORAL
COMMUNITY
Start: 2022-08-17 | End: 2022-09-12

## 2022-09-12 NOTE — PROGRESS NOTES
Chief Complaint   Patient presents with   • FU from [facility] / bilateral feet wounds      Pt say's, therapist said, they are getting better. However, it's been two months and he wants to know what is causing this issue. Just came from wound care. Didn't unwrap bandages.        Subjective      Palomo Rainey is a 82 y.o. who presents for concern over persistence of bilateral lower extremity/foot wounds with edema.  Patient first presented to this office back in July.  Imaging revealed DVT with report indicating right leg.  It is suspected that it was actually the left leg that was involved.  Nonetheless he returned to taking Eliquis.  Patient has a history of DVTs.  The leg wounds seem to respond to courses of doxycycline.  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  There were no signs of venous insufficiency on ultrasound and blood flow with adequate on arterial imaging.  Patient has been attending a local wound care at [facility].  He states the therapist believes the wounds are improving but he remains concerned about the level of edema in his legs.  He is concerned it is from the edema generally leading to the appearance of a reddish-purple lesion on the foot or ankle that will then open and become one of the ulcerated lesions he has been treated for.  Patient has a history of coronary artery disease but states he does not have heart failure and has had recent cardiac evaluation.  Patient also claims to be taking 2 diuretics but it was unclear as the visit progressed whether this is accurate as he uses multiple pharmacies for his medications.  Patient cannot recall the names of his diuretics.  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  He has brought up his edema and wounds with his oncologist.  While he has a history of anemia he has not required any recent transfusions.    Patient wears compression stockings  "during the day but not in the evening.  He reports a 4-day span where the edema improved but he does not know why.  While he admits to trying to increase fluid intake a brief review shows the patient is likely still not drinking enough fluids.  He does drink a beverage with sodium in although admits overall cutting back and sodium ingestion.  Finally he has been instructed on multiple occasions to elevate the legs but he has difficulty doing this because of pain.  Oddly enough if he is simply trying to elevate the legs such as in a chair or in bed he will develop pain in the feet that radiates up to the knees.  However if he flexes his hip and knee the pain will go away.    The following portions of the patient's history were reviewed and updated as appropriate: allergies, current medications, past family history, past medical history, past social history, past surgical history and problem list.    Review of Systems    Objective   Vital Signs:  /60   Pulse 97   Temp 97.7 °F (36.5 °C)   Resp 20   Ht 188 cm (74.02\")   Wt 78.4 kg (172 lb 12.8 oz)   SpO2 98%   BMI 22.18 kg/m²     BMI is within normal parameters. No other follow-up for BMI required.        Physical Exam  Musculoskeletal:      Right lower leg: Edema present.      Left lower leg: Edema present.   Skin:     General: Skin is warm.      Findings: Erythema present.      Comments: Left foot healing madeline sized superficial skin ulcer   Neurological:      Mental Status: He is alert.          Result Review   The following data was reviewed by: Kishor Julien MD on 09/12/2022:    Data reviewed: Radiologic studies Venous Doppler and arterial Doppler July 2022 and August 2022 and Consultant notes Vascular surgery notes from August, wound care notes from August, oncology note from May and July 2022              Assessment and Plan  Diagnoses and all orders for this visit:    1. Bilateral lower extremity edema (Primary)  Comments:  Daily low dose " furosemide. Wear compression stockings 24/hr day. RTO 1 week. Believe Opdivo is cause  Orders:  -     furosemide (Lasix) 20 MG tablet; Take 1 tablet by mouth Daily.  Dispense: 30 tablet; Refill: 0    2. Mesothelioma (pleural) (HCC)    3. Side effect of medication    I suspect the patient's bilateral lower extremity edema which leads to venous ulcers is secondary to his Opdivo.  Literature review indicates a 10 to 20% chance of edema with this medication.  We will try a short course of low-dose furosemide 20 mg daily.  Patient will wear his compression stockings continuously.  He will continue to try to elevate the legs in a way that does not cause pain.  He will return in 1 week.  Nursing staff did call his listed pharmacy which had no record of any diuretics including the hydrochlorothiazide and his medication list.  Patient will call back with a full medication list    I spent 50 minutes caring for Palomo on this date of service. This time includes time spent by me in the following activities:preparing for the visit, reviewing tests, performing a medically appropriate examination and/or evaluation , counseling and educating the patient/family/caregiver, ordering medications, tests, or procedures and documenting information in the medical record    Follow Up  Return in about 1 year (around 9/12/2023).  Patient was given instructions and counseling regarding his condition or for health maintenance advice. Please see specific information pulled into the AVS if appropriate.

## 2022-09-19 ENCOUNTER — OFFICE VISIT (OUTPATIENT)
Dept: FAMILY MEDICINE CLINIC | Facility: CLINIC | Age: 82
End: 2022-09-19

## 2022-09-19 VITALS
SYSTOLIC BLOOD PRESSURE: 110 MMHG | BODY MASS INDEX: 21.61 KG/M2 | TEMPERATURE: 99.3 F | RESPIRATION RATE: 20 BRPM | OXYGEN SATURATION: 98 % | HEIGHT: 74 IN | WEIGHT: 168.4 LBS | DIASTOLIC BLOOD PRESSURE: 55 MMHG | HEART RATE: 105 BPM

## 2022-09-19 DIAGNOSIS — R60.0 BILATERAL LOWER EXTREMITY EDEMA: Primary | ICD-10-CM

## 2022-09-19 DIAGNOSIS — T88.7XXA SIDE EFFECT OF MEDICATION: ICD-10-CM

## 2022-09-19 PROCEDURE — 99212 OFFICE O/P EST SF 10 MIN: CPT | Performed by: FAMILY MEDICINE

## 2022-09-19 RX ORDER — LEVOTHYROXINE SODIUM 175 UG/1
TABLET ORAL
COMMUNITY
Start: 2022-08-29 | End: 2022-12-01

## 2022-09-19 NOTE — PROGRESS NOTES
"Chief Complaint   Patient presents with   • Follow-up   • Leg Swelling     BLE edema        Subjective      Palomo Rainey is a 82 y.o. who presents for edema follow up. Wearing compression stockings for almost 24 hours per day benefitted edema overall and made elevation of the legs less painful. Today weight is down 4lbs but swelling is increased as he was unable to wear compression stockings overnight. He has been taking Lasix prescribed by Dr. Burleson.    The following portions of the patient's history were reviewed and updated as appropriate: allergies, current medications, past family history, past medical history, past social history, past surgical history and problem list.    Review of Systems    Objective   Vital Signs:  /55   Pulse 105   Temp 99.3 °F (37.4 °C)   Resp 20   Ht 188 cm (74.02\")   Wt 76.4 kg (168 lb 6.4 oz)   SpO2 98%   BMI 21.61 kg/m²     BMI is within normal parameters. No other follow-up for BMI required.        Physical Exam  Musculoskeletal:      Right lower leg: Edema present.      Left lower leg: Edema present.   Neurological:      Mental Status: He is alert.          Result Review                     Assessment and Plan  Diagnoses and all orders for this visit:    1. Bilateral lower extremity edema (Primary)  Comments:  Side effect of Opdivo is most likely cause. Compresssion stockings are key. Patient understands.    2. Side effect of medication            Follow Up  No follow-ups on file.  Patient was given instructions and counseling regarding his condition or for health maintenance advice. Please see specific information pulled into the AVS if appropriate.       "

## 2022-09-30 ENCOUNTER — TELEPHONE (OUTPATIENT)
Dept: FAMILY MEDICINE CLINIC | Facility: CLINIC | Age: 82
End: 2022-09-30

## 2022-09-30 DIAGNOSIS — R26.81 UNSTEADY GAIT WHEN WALKING: ICD-10-CM

## 2022-09-30 DIAGNOSIS — Z91.81 POTENTIAL FOR FALLS: Primary | ICD-10-CM

## 2022-10-03 DIAGNOSIS — Z91.81 POTENTIAL FOR FALLS: ICD-10-CM

## 2022-10-03 DIAGNOSIS — R26.81 UNSTEADY GAIT WHEN WALKING: ICD-10-CM

## 2022-10-18 ENCOUNTER — TELEPHONE (OUTPATIENT)
Dept: FAMILY MEDICINE CLINIC | Facility: CLINIC | Age: 82
End: 2022-10-18

## 2022-10-18 NOTE — TELEPHONE ENCOUNTER
CRYSTAL WITH [facility] PT CALLED STATING THAT THE NEED AN ORDER FAXED TO THE FOLLOWING  [phone]      PT IS REALLY WEAK IN HIS LEGS AND WOBBLY.

## 2022-11-30 ENCOUNTER — TELEPHONE (OUTPATIENT)
Dept: FAMILY MEDICINE CLINIC | Facility: CLINIC | Age: 82
End: 2022-11-30

## 2022-11-30 DIAGNOSIS — R26.81 UNSTEADY GAIT WHEN WALKING: Primary | ICD-10-CM

## 2022-11-30 DIAGNOSIS — Z91.81 POTENTIAL FOR FALLS: ICD-10-CM

## 2022-11-30 NOTE — TELEPHONE ENCOUNTER
Caller: HERNANDEZ SOLO    Relationship: Caregiver (non-relative)    Best call back number: [phone]    What is the medical concern/diagnosis:   FEET AND LEG CONCERNS, WEAKNESS, BALANCE CONCERNS    What specialty or service is being requested: PHYSICAL THERAPY    What is the provider, practice or medical service name[facility]    What is the office location: [address] [place] [address] [address] [place], [place].     What is the office phone number:     PHONE: [phone]    FAX: [phone]

## 2022-12-01 ENCOUNTER — TELEPHONE (OUTPATIENT)
Dept: FAMILY MEDICINE CLINIC | Facility: CLINIC | Age: 82
End: 2022-12-01

## 2022-12-01 ENCOUNTER — OFFICE VISIT (OUTPATIENT)
Dept: FAMILY MEDICINE CLINIC | Facility: CLINIC | Age: 82
End: 2022-12-01

## 2022-12-01 VITALS
HEART RATE: 86 BPM | BODY MASS INDEX: 19.89 KG/M2 | RESPIRATION RATE: 14 BRPM | TEMPERATURE: 96.8 F | HEIGHT: 74 IN | DIASTOLIC BLOOD PRESSURE: 60 MMHG | OXYGEN SATURATION: 98 % | WEIGHT: 155 LBS | SYSTOLIC BLOOD PRESSURE: 98 MMHG

## 2022-12-01 DIAGNOSIS — H66.001 NON-RECURRENT ACUTE SUPPURATIVE OTITIS MEDIA OF RIGHT EAR WITHOUT SPONTANEOUS RUPTURE OF TYMPANIC MEMBRANE: Primary | ICD-10-CM

## 2022-12-01 PROCEDURE — 99213 OFFICE O/P EST LOW 20 MIN: CPT | Performed by: FAMILY MEDICINE

## 2022-12-01 RX ORDER — AMOXICILLIN AND CLAVULANATE POTASSIUM 875; 125 MG/1; MG/1
1 TABLET, FILM COATED ORAL 2 TIMES DAILY
Qty: 14 TABLET | Refills: 0 | Status: SHIPPED | OUTPATIENT
Start: 2022-12-01 | End: 2022-12-08

## 2022-12-01 RX ORDER — LEVOTHYROXINE SODIUM 0.2 MG/1
200 TABLET ORAL DAILY
COMMUNITY
Start: 2022-11-23

## 2022-12-01 NOTE — TELEPHONE ENCOUNTER
Pt saw Dr Mike today for acute visit. Stated during the visit, he needs us to call/fax Dr Flor clearing him for wrist surgery?    I don't see that he's had a pre-op w/ us. Do you know anything about this? Pt aware you're out of the office until next wk.

## 2022-12-01 NOTE — PROGRESS NOTES
Chief Complaint  Earache (L. 'Feels like there is sand in it.')    Subjective          Palomo Rainey presents to [facility]  Earache   There is pain in both ears. This is a new problem. The current episode started 1 to 4 weeks ago. There has been no fever. Associated symptoms include hearing loss. Pertinent negatives include no ear discharge. Associated symptoms comments: Imbalance and ambulating with walker. He has tried nothing for the symptoms. The treatment provided no relief.       The following portions of the patient's history were reviewed and updated as appropriate: allergies, current medications, past family history, past medical history, past social history, past surgical history and problem list.    Objective      Physical Exam  Vitals reviewed.   Constitutional:       Appearance: He is well-developed.   HENT:      Head: Normocephalic and atraumatic.      Right Ear: Ear canal and external ear normal. Tympanic membrane is injected.      Left Ear: Tympanic membrane, ear canal and external ear normal.   Cardiovascular:      Rate and Rhythm: Normal rate and regular rhythm.      Heart sounds: Normal heart sounds.   Pulmonary:      Effort: Pulmonary effort is normal. No respiratory distress.   Musculoskeletal:      Comments: Ambulates with walker    Neurological:      Mental Status: He is alert.        Result Review :                Assessment and Plan    Diagnoses and all orders for this visit:    1. Non-recurrent acute suppurative otitis media of right ear without spontaneous rupture of tympanic membrane (Primary)  -     amoxicillin-clavulanate (Augmentin) 875-125 MG per tablet; Take 1 tablet by mouth 2 (Two) Times a Day for 7 days.  Dispense: 14 tablet; Refill: 0    Treatment plan above  Follow up INB       Follow Up   No follow-ups on file.  Patient was given instructions and counseling regarding his condition or for health maintenance advice. Please see specific  information pulled into the AVS if appropriate.

## 2022-12-07 NOTE — TELEPHONE ENCOUNTER
I have actually not seen him in the office since September 20, 2021.  He would need to be seen by me if he needs a preoperative clearance for this or any other provider that has an opening for preoperative clearance.      We would also need to call Dr. Ozuna's office to see what they need in regards to preoperative clearance.  I EKG, labs, etc.

## 2022-12-07 NOTE — TELEPHONE ENCOUNTER
Informed pt he needed an actual pre op clearance visit scheduled with someone here. He said, he see's the surgeon on 12-12-22 and would see what they need done at that time and when the surgery will actually be scheduled for, since this has to be within 30 days.

## 2022-12-30 ENCOUNTER — OFFICE VISIT (OUTPATIENT)
Dept: FAMILY MEDICINE CLINIC | Facility: CLINIC | Age: 82
End: 2022-12-30

## 2022-12-30 VITALS
DIASTOLIC BLOOD PRESSURE: 65 MMHG | BODY MASS INDEX: 20.02 KG/M2 | SYSTOLIC BLOOD PRESSURE: 115 MMHG | RESPIRATION RATE: 24 BRPM | OXYGEN SATURATION: 97 % | WEIGHT: 156 LBS | HEIGHT: 74 IN | TEMPERATURE: 98.4 F | HEART RATE: 103 BPM

## 2022-12-30 DIAGNOSIS — Z01.810 PREOPERATIVE CARDIOVASCULAR EXAMINATION: Primary | ICD-10-CM

## 2022-12-30 DIAGNOSIS — I25.10 CORONARY ARTERY DISEASE INVOLVING NATIVE CORONARY ARTERY OF NATIVE HEART WITHOUT ANGINA PECTORIS: ICD-10-CM

## 2022-12-30 DIAGNOSIS — Z79.01 LONG TERM CURRENT USE OF ANTICOAGULANT: ICD-10-CM

## 2022-12-30 DIAGNOSIS — Z86.718 HISTORY OF DVT OF LOWER EXTREMITY: ICD-10-CM

## 2022-12-30 PROCEDURE — 93005 ELECTROCARDIOGRAM TRACING: CPT | Performed by: FAMILY MEDICINE

## 2022-12-30 PROCEDURE — 99214 OFFICE O/P EST MOD 30 MIN: CPT | Performed by: FAMILY MEDICINE

## 2022-12-30 PROCEDURE — 99999 PR OFFICE/OUTPT VISIT,PROCEDURE ONLY: CPT | Performed by: FAMILY MEDICINE

## 2022-12-30 RX ORDER — MEGESTROL ACETATE 40 MG/ML
SUSPENSION ORAL
COMMUNITY
Start: 2022-12-19

## 2022-12-30 NOTE — PROGRESS NOTES
"Chief Complaint   Patient presents with   • Pre op clearance      Left ulnar nerve entrapment / Blake Flor MD       Subjective      Palomo Rainey is a 82 y.o. who presents for preoperative evaluation for anticipated Left Cubital Tunnel Release and Carpal tunnel release to be performed by Dr. Flor.  Surgery has not been scheduled at this time.  Patient is unsure form of anesthesia.  Patient has not had any difficulties with prior anesthesia.    Past medical history is significant for non-ST elevation MI in 2018.  Patient underwent stenting of the circumflex at that time.  Patient's cardiologist is Dr. Cantu, who he sees yearly.  Echocardiogram performed in April of this year showed normal ejection fraction.  Patient denies chest pain, orthopnea, dyspnea on exertion, pedal edema.    Past medical history also significant for mesothelioma.  Oncologist is Dr. Vincent Burleson.    The following portions of the patient's history were reviewed and updated as appropriate: allergies, current medications, past family history, past medical history, past social history, past surgical history and problem list.    Review of Systems   Constitutional: Negative.    HENT: Negative.    Eyes: Negative.    Respiratory: Negative.    Cardiovascular: Negative.    Gastrointestinal: Negative.    Endocrine: Negative.    Genitourinary: Negative.        Objective   Vital Signs:  /65   Pulse 103   Temp 98.4 °F (36.9 °C)   Resp 24   Ht 188 cm (74.02\")   Wt 70.8 kg (156 lb)   SpO2 97%   BMI 20.02 kg/m²     BMI is within normal parameters. No other follow-up for BMI required.        Physical Exam  Constitutional:       Appearance: Normal appearance.   HENT:      Head: Normocephalic and atraumatic.      Right Ear: Tympanic membrane and ear canal normal.      Left Ear: Tympanic membrane and ear canal normal.      Nose: Nose normal.      Mouth/Throat:      Mouth: Mucous membranes are moist.      Pharynx: Oropharynx is clear.   Eyes: "      Conjunctiva/sclera: Conjunctivae normal.   Cardiovascular:      Rate and Rhythm: Normal rate and regular rhythm.      Heart sounds: Normal heart sounds. No murmur heard.  Pulmonary:      Effort: Pulmonary effort is normal. No respiratory distress.      Breath sounds: Normal breath sounds.   Abdominal:      General: Abdomen is flat. Bowel sounds are normal. There is no distension.      Palpations: Abdomen is soft.      Tenderness: There is no abdominal tenderness.   Musculoskeletal:      Cervical back: Normal range of motion and neck supple. No tenderness.   Lymphadenopathy:      Cervical: No cervical adenopathy.   Skin:     General: Skin is warm and dry.   Neurological:      Mental Status: He is alert.   Psychiatric:         Mood and Affect: Mood normal.          Result Review   The following data was reviewed by: Kishor Julien MD on 12/30/2022:      Data reviewed: CBC, CMP, TSH obtained this month within normal limits       ECG 12 Lead    Date/Time: 12/30/2022 1:35 PM  Performed by: Kishor Julien MD  Authorized by: Kishor Julien MD   Comparison: compared with previous ECG from 6/28/2021  Similar to previous ECG  Comparison to previous ECG: Mild sinus tachycardia persists.  Current EKG shows PVC.  Rhythm: sinus tachycardia  Ectopy: infrequent PVCs  Rate: tachycardic  Conduction: conduction normal  ST Segments: ST segments normal  T Waves: T waves normal  QRS axis: normal    Clinical impression: normal ECG  Comments: EKG is normal with the exception of mild tachycardia                Assessment and Plan  Diagnoses and all orders for this visit:    1. Preoperative cardiovascular examination (Primary)    2. Long term current use of anticoagulant    3. History of DVT of lower extremity    4. Coronary artery disease involving native coronary artery of native heart without angina pectoris    Other orders  -     ECG 12 Lead    Plan:  Patient may proceed with surgery.  Patient has already been  advised by his oncologist to discontinue Eliquis 48 hours prior to surgery and restart medication the day after surgery.  Copy of note will be faxed to Dr. Hood's office        Follow Up  Return if symptoms worsen or fail to improve.  Patient was given instructions and counseling regarding his condition or for health maintenance advice. Please see specific information pulled into the AVS if appropriate.

## 2023-05-16 ENCOUNTER — TELEPHONE (OUTPATIENT)
Dept: FAMILY MEDICINE CLINIC | Facility: CLINIC | Age: 83
End: 2023-05-16

## 2023-05-16 NOTE — TELEPHONE ENCOUNTER
Caller: HERNANDEZ CONLEY     Relationship: CAREGIVER    Best call back number: [phone]    What is your medical concern? LEFT HAND IS NUMB- HAS BEEN SEEN FOR THIS, BUT HAS NOT HEARD BACK ABOUT REFERRAL FOR SURGERY. WANTS REFERRAL TO [facility]-     HESITANT PEEING, BURNS WITH URINATION, MILKY PEE, STOMACH AND BACK PAIN,     How long has this issue been going on? ON AND OFF FOR A MONTH    Is your provider already aware of this issue? NO    Have you been treated for this issue? NO    PHARMACY: [facility] - [address] - [phone]  - [phone]    PLEASE CALL TO ADVISE- COULD NOT BRING HIM FOR AN APPOINTMENT UNTIL THURSDAY

## 2023-05-16 NOTE — TELEPHONE ENCOUNTER
Please call.    Okay to drop off urinalysis tomorrow.  Please give to Tory to review.  He has an appointment with Tory on Thursday.    I have not seen since September 2021 so will not be able to address the hand issue but she can look at at the appointment on Thursday.

## 2023-05-17 NOTE — TELEPHONE ENCOUNTER
Pt stated he is in the hospital due to dehydration-asked if they ran urine test said yes then pt stated had to go and phone was disconnected-stated may get out today or tomorrow not sure if will make it to the appt 5/18

## 2023-08-22 ENCOUNTER — TELEPHONE (OUTPATIENT)
Dept: FAMILY MEDICINE CLINIC | Facility: CLINIC | Age: 83
End: 2023-08-22
Payer: MEDICARE

## 2023-08-22 DIAGNOSIS — C45.9 MESOTHELIOMA: Primary | ICD-10-CM

## 2023-08-22 NOTE — TELEPHONE ENCOUNTER
Spoke with Sherita he was dx with mesothelioma in 2021. Treatment was stopped April 2023 because there was nothing else to be done. However current cancer doctor would not refer due to his age

## 2023-08-22 NOTE — TELEPHONE ENCOUNTER
Sherita, pt caregiver, called stating pt was requesting a referral to [facility]. He is wanting a second opinion on treatment.      [phone]

## 2023-08-22 NOTE — TELEPHONE ENCOUNTER
Please call more information please I have not seen him since 2021.  Need to confirm current diagnosis.

## 2024-02-20 NOTE — PROGRESS NOTES
"[facility] Pulmonary Evaluation    Chief Complaint  Pleural Effusion (Hospital follow up)    Referring Provider:     Rigoberto          Palomo Rainey presents to [facility] for evaluation of a left pleural effusion.    He was in the hospital June 28 through the 30th with worsening shortness of breath and weakness and was found to have a very large left pleural effusion.  He did undergo ultrasound-guided thoracentesis on the 29th with 2100 mL of clear yellow fluid removed.  Unfortunately on the day of the procedure the labs were not sent so no evaluation of the flriod was done at the time.  Follow-up chest x-ray on the 30th showed a small persistent left effusion with some atelectasis.    He is here today for follow-up after his hospital stay and follow-up chest x-ray.    After discharge he felt rather weak and debilitated.  Apparently he received a IV magnesium infusion which he had a bit of a reaction to that left him fairly weakened.    He says his shortness of breath with activity has improved slowly.  He says he feels better today than he did the last couple of days.  Initially he was having quite a bit of shortness of breath with minimal activity as well as some tachycardia with activity.  For several weeks prior to his presentation to the ER.  He is also been started on some Synthroid by his primary care a few weeks prior to this incident.          Objective     Vital Signs:   /82   Pulse 110   Temp 98.2 °F (36.8 °C)   Ht 188 cm (74\")   Wt 85.7 kg (189 lb)   SpO2 96% Comment: room air  at rest  BMI 24.27 kg/m²       Immunization History   Administered Date(s) Administered   • COVID-19 (JESSICA) 04/06/2021   • Fluzone High Dose =>65 Years ([employer] ONLY) 09/21/2016   • Pneumococcal Polysaccharide (PPSV23) 10/11/2016       Physical Exam  Vitals reviewed.   Constitutional:       Appearance: He is well-developed.   HENT:      Head: Normocephalic " and atraumatic.   Eyes:      Pupils: Pupils are equal, round, and reactive to light.   Cardiovascular:      Rate and Rhythm: Normal rate and regular rhythm.      Heart sounds: No murmur heard.     Pulmonary:      Effort: Pulmonary effort is normal. No respiratory distress.      Breath sounds: No wheezing or rales.      Comments: Decrease in left lower lobe with some fine rales  Abdominal:      General: Bowel sounds are normal. There is no distension.      Palpations: Abdomen is soft.   Musculoskeletal:         General: Normal range of motion.      Cervical back: Normal range of motion and neck supple.   Skin:     General: Skin is warm and dry.      Findings: No erythema.   Neurological:      Mental Status: He is alert and oriented to person, place, and time.   Psychiatric:         Behavior: Behavior normal.          Result Review :       Data reviewed: Radiologic studies CT of the chest and Recent hospitalization notes from [facility] summary          PA and lateral chest x-ray done the office today reviewed by me  Awaiting final MD interpretation                 Assessment and Plan    Problem List Items Addressed This Visit        Pulmonary and Pneumonias    Pleural effusion on left - Primary    Relevant Orders    XR Chest PA & Lateral      Other Visit Diagnoses     Shortness of breath              Reviewed his chest x-ray today in the office.  We discussed options on following up on this pleural effusion.  He continues to have some abnormality in the left lower lobe.    I did offer a follow-up CT scan of the chest since the pleural effusion has been removed to rule out any underlying abnormality, at this time he would like to hold off.    I did recommend a follow-up chest x-ray in at least 3 to 4 weeks to make sure continued resolution or early stability of that left lower lobe small effusion and atelectasis.  Encouraged him to continue with his activity.        I spent 35 minutes caring for Palomo on this date of  service. This time includes time spent by me in the following activities:preparing for the visit, reviewing tests, obtaining and/or reviewing a separately obtained history, performing a medically appropriate examination and/or evaluation , counseling and educating the patient/family/caregiver, ordering medications, tests, or procedures, referring and communicating with other health care professionals , documenting information in the medical record and independently interpreting results and communicating that information with the patient/family/caregiver    Follow Up     No follow-ups on file.  Patient was given instructions and counseling regarding his condition or for health maintenance advice. Please see specific information pulled into the AVS if appropriate.     VALERIA Beltran, ACNP  [facility]  Electronically signed    Statement Selected

## (undated) DEVICE — CATH DIAG EXPO M/ PK 5F FL4/FR4 PIG

## (undated) DEVICE — TREK CORONARY DILATATION CATHETER 2.50 MM X 15 MM / RAPID-EXCHANGE: Brand: TREK

## (undated) DEVICE — DEV COMP RAD PRELUDESYNC 24CM

## (undated) DEVICE — INTRO SHEATH PRELUDE IDEAL SPRNG COIL 021 6F 23X80CM

## (undated) DEVICE — NC TREK CORONARY DILATATION CATHETER 3.0 MM X 15 MM / RAPID-EXCHANGE: Brand: NC TREK

## (undated) DEVICE — ST EXT IV SMARTSITE 2VLV SP M LL 5ML IV1

## (undated) DEVICE — TREK CORONARY DILATATION CATHETER 3.0 MM X 15 MM / RAPID-EXCHANGE: Brand: TREK

## (undated) DEVICE — MINI TREK CORONARY DILATATION CATHETER 2.0 MM X 30 MM / RAPID-EXCHANGE: Brand: MINI TREK

## (undated) DEVICE — DEV INFL MONARCH 25W

## (undated) DEVICE — MODEL AT P65, P/N 701554-001KIT CONTENTS: HAND CONTROLLER, 3-WAY HIGH-PRESSURE STOPCOCK WITH ROTATING END AND PREMIUM HIGH-PRESSURE TUBING: Brand: ANGIOTOUCH® KIT

## (undated) DEVICE — CATH DIAG EXPO .045 FL3.5 5F 100CM

## (undated) DEVICE — RADIFOCUS GLIDEWIRE: Brand: GLIDEWIRE

## (undated) DEVICE — GW FC FLOP/TP .035 260CM 3MM

## (undated) DEVICE — GUIDE CATHETER: Brand: MACH1™

## (undated) DEVICE — PK CATH CARD 10

## (undated) DEVICE — MODEL BT2000 P/N 700287-012KIT CONTENTS: MANIFOLD WITH SALINE AND CONTRAST PORTS, SALINE TUBING WITH SPIKE AND HAND SYRINGE, TRANSDUCER: Brand: BT2000 AUTOMATED MANIFOLD KIT

## (undated) DEVICE — Device: Brand: ASAHI SION BLUE

## (undated) DEVICE — ST INF PRI SMRTSTE 20DRP 2VLV 24ML 117